# Patient Record
Sex: FEMALE | Race: WHITE | NOT HISPANIC OR LATINO | Employment: FULL TIME | URBAN - METROPOLITAN AREA
[De-identification: names, ages, dates, MRNs, and addresses within clinical notes are randomized per-mention and may not be internally consistent; named-entity substitution may affect disease eponyms.]

---

## 2020-02-19 LAB — RUBELLA ANTIBODY IGG QUANTITATIVE: NORMAL IU/ML

## 2020-06-16 LAB — HBV SURFACE AG SERPL QL IA: NEGATIVE

## 2020-08-28 LAB
GROUP B STREP PCR: POSITIVE
HIV 1+2 AB+HIV1 P24 AG SERPL QL IA: NEGATIVE

## 2020-10-06 ENCOUNTER — HOSPITAL ENCOUNTER (INPATIENT)
Facility: CLINIC | Age: 30
LOS: 3 days | Discharge: HOME | End: 2020-10-09
Attending: ADVANCED PRACTICE MIDWIFE | Admitting: ADVANCED PRACTICE MIDWIFE
Payer: COMMERCIAL

## 2020-10-06 ENCOUNTER — ANESTHESIA EVENT (OUTPATIENT)
Dept: OBGYN | Facility: CLINIC | Age: 30
End: 2020-10-06
Payer: COMMERCIAL

## 2020-10-06 ENCOUNTER — ANESTHESIA (OUTPATIENT)
Dept: OBGYN | Facility: CLINIC | Age: 30
End: 2020-10-06
Payer: COMMERCIAL

## 2020-10-06 DIAGNOSIS — Z98.891 S/P CESAREAN SECTION: Primary | ICD-10-CM

## 2020-10-06 PROBLEM — Z34.93 PREGNANCY WITH PRENATAL CARE ELSEWHERE IN THIRD TRIMESTER: Status: ACTIVE | Noted: 2020-10-06

## 2020-10-06 PROBLEM — O48.0 POST-TERM PREGNANCY, 40-42 WEEKS OF GESTATION: Status: ACTIVE | Noted: 2020-10-06

## 2020-10-06 PROBLEM — B95.1 POSITIVE GBS TEST: Status: ACTIVE | Noted: 2020-10-06

## 2020-10-06 LAB
ABO + RH BLD: NORMAL
ABO + RH BLD: NORMAL
BASOPHILS # BLD AUTO: 0 10E9/L (ref 0–0.2)
BASOPHILS NFR BLD AUTO: 0.2 %
BLD GP AB SCN SERPL QL: NORMAL
BLOOD BANK CMNT PATIENT-IMP: NORMAL
DIFFERENTIAL METHOD BLD: ABNORMAL
EOSINOPHIL # BLD AUTO: 0.1 10E9/L (ref 0–0.7)
EOSINOPHIL NFR BLD AUTO: 0.4 %
ERYTHROCYTE [DISTWIDTH] IN BLOOD BY AUTOMATED COUNT: 12.3 % (ref 10–15)
HCT VFR BLD AUTO: 36.7 % (ref 35–47)
HGB BLD-MCNC: 12.6 G/DL (ref 11.7–15.7)
IMM GRANULOCYTES # BLD: 0.1 10E9/L (ref 0–0.4)
IMM GRANULOCYTES NFR BLD: 0.6 %
LABORATORY COMMENT REPORT: NORMAL
LYMPHOCYTES # BLD AUTO: 2.5 10E9/L (ref 0.8–5.3)
LYMPHOCYTES NFR BLD AUTO: 13.5 %
MCH RBC QN AUTO: 33.1 PG (ref 26.5–33)
MCHC RBC AUTO-ENTMCNC: 34.3 G/DL (ref 31.5–36.5)
MCV RBC AUTO: 96 FL (ref 78–100)
MONOCYTES # BLD AUTO: 1.5 10E9/L (ref 0–1.3)
MONOCYTES NFR BLD AUTO: 7.8 %
NEUTROPHILS # BLD AUTO: 14.6 10E9/L (ref 1.6–8.3)
NEUTROPHILS NFR BLD AUTO: 77.5 %
NRBC # BLD AUTO: 0 10*3/UL
NRBC BLD AUTO-RTO: 0 /100
PLATELET # BLD AUTO: 168 10E9/L (ref 150–450)
RBC # BLD AUTO: 3.81 10E12/L (ref 3.8–5.2)
SARS-COV-2 RNA SPEC QL NAA+PROBE: NEGATIVE
SARS-COV-2 RNA SPEC QL NAA+PROBE: NORMAL
SPECIMEN EXP DATE BLD: NORMAL
SPECIMEN SOURCE: NORMAL
SPECIMEN SOURCE: NORMAL
T PALLIDUM AB SER QL: NONREACTIVE
WBC # BLD AUTO: 18.8 10E9/L (ref 4–11)

## 2020-10-06 PROCEDURE — 250N000011 HC RX IP 250 OP 636: Performed by: ADVANCED PRACTICE MIDWIFE

## 2020-10-06 PROCEDURE — 250N000011 HC RX IP 250 OP 636

## 2020-10-06 PROCEDURE — U0003 INFECTIOUS AGENT DETECTION BY NUCLEIC ACID (DNA OR RNA); SEVERE ACUTE RESPIRATORY SYNDROME CORONAVIRUS 2 (SARS-COV-2) (CORONAVIRUS DISEASE [COVID-19]), AMPLIFIED PROBE TECHNIQUE, MAKING USE OF HIGH THROUGHPUT TECHNOLOGIES AS DESCRIBED BY CMS-2020-01-R: HCPCS | Performed by: ADVANCED PRACTICE MIDWIFE

## 2020-10-06 PROCEDURE — 86780 TREPONEMA PALLIDUM: CPT | Performed by: ADVANCED PRACTICE MIDWIFE

## 2020-10-06 PROCEDURE — 250N000009 HC RX 250: Performed by: ADVANCED PRACTICE MIDWIFE

## 2020-10-06 PROCEDURE — 86850 RBC ANTIBODY SCREEN: CPT | Performed by: ADVANCED PRACTICE MIDWIFE

## 2020-10-06 PROCEDURE — 00HU33Z INSERTION OF INFUSION DEVICE INTO SPINAL CANAL, PERCUTANEOUS APPROACH: ICD-10-PCS | Performed by: ANESTHESIOLOGY

## 2020-10-06 PROCEDURE — G0463 HOSPITAL OUTPT CLINIC VISIT: HCPCS

## 2020-10-06 PROCEDURE — 85025 COMPLETE CBC W/AUTO DIFF WBC: CPT | Performed by: ADVANCED PRACTICE MIDWIFE

## 2020-10-06 PROCEDURE — 86901 BLOOD TYPING SEROLOGIC RH(D): CPT | Performed by: ADVANCED PRACTICE MIDWIFE

## 2020-10-06 PROCEDURE — 120N000002 HC R&B MED SURG/OB UMMC

## 2020-10-06 PROCEDURE — 3E0R3BZ INTRODUCTION OF ANESTHETIC AGENT INTO SPINAL CANAL, PERCUTANEOUS APPROACH: ICD-10-PCS | Performed by: ANESTHESIOLOGY

## 2020-10-06 PROCEDURE — 258N000003 HC RX IP 258 OP 636: Performed by: ADVANCED PRACTICE MIDWIFE

## 2020-10-06 PROCEDURE — 86900 BLOOD TYPING SEROLOGIC ABO: CPT | Performed by: ADVANCED PRACTICE MIDWIFE

## 2020-10-06 RX ORDER — PENICILLIN G POTASSIUM 5000000 [IU]/1
5 INJECTION, POWDER, FOR SOLUTION INTRAMUSCULAR; INTRAVENOUS ONCE
Status: COMPLETED | OUTPATIENT
Start: 2020-10-06 | End: 2020-10-06

## 2020-10-06 RX ORDER — NALBUPHINE HYDROCHLORIDE 20 MG/ML
2.5-5 INJECTION, SOLUTION INTRAMUSCULAR; INTRAVENOUS; SUBCUTANEOUS EVERY 6 HOURS PRN
Status: DISCONTINUED | OUTPATIENT
Start: 2020-10-06 | End: 2020-10-07

## 2020-10-06 RX ORDER — OXYTOCIN/0.9 % SODIUM CHLORIDE 30/500 ML
PLASTIC BAG, INJECTION (ML) INTRAVENOUS
Status: DISCONTINUED
Start: 2020-10-06 | End: 2020-10-07 | Stop reason: HOSPADM

## 2020-10-06 RX ORDER — NALOXONE HYDROCHLORIDE 0.4 MG/ML
.1-.4 INJECTION, SOLUTION INTRAMUSCULAR; INTRAVENOUS; SUBCUTANEOUS
Status: DISCONTINUED | OUTPATIENT
Start: 2020-10-06 | End: 2020-10-07

## 2020-10-06 RX ORDER — ACETAMINOPHEN 325 MG/1
650 TABLET ORAL EVERY 4 HOURS PRN
Status: DISCONTINUED | OUTPATIENT
Start: 2020-10-06 | End: 2020-10-07

## 2020-10-06 RX ORDER — FENTANYL/BUPIVACAINE/NS/PF 2-1250MCG
PLASTIC BAG, INJECTION (ML) INJECTION
Status: COMPLETED
Start: 2020-10-06 | End: 2020-10-06

## 2020-10-06 RX ORDER — IBUPROFEN 800 MG/1
800 TABLET, FILM COATED ORAL
Status: DISCONTINUED | OUTPATIENT
Start: 2020-10-06 | End: 2020-10-07

## 2020-10-06 RX ORDER — OXYTOCIN 10 [USP'U]/ML
INJECTION, SOLUTION INTRAMUSCULAR; INTRAVENOUS
Status: DISCONTINUED
Start: 2020-10-06 | End: 2020-10-07 | Stop reason: HOSPADM

## 2020-10-06 RX ORDER — SODIUM CHLORIDE, SODIUM LACTATE, POTASSIUM CHLORIDE, CALCIUM CHLORIDE 600; 310; 30; 20 MG/100ML; MG/100ML; MG/100ML; MG/100ML
INJECTION, SOLUTION INTRAVENOUS CONTINUOUS
Status: DISCONTINUED | OUTPATIENT
Start: 2020-10-06 | End: 2020-10-07

## 2020-10-06 RX ORDER — CARBOPROST TROMETHAMINE 250 UG/ML
250 INJECTION, SOLUTION INTRAMUSCULAR
Status: DISCONTINUED | OUTPATIENT
Start: 2020-10-06 | End: 2020-10-07

## 2020-10-06 RX ORDER — ONDANSETRON 2 MG/ML
4 INJECTION INTRAMUSCULAR; INTRAVENOUS EVERY 6 HOURS PRN
Status: DISCONTINUED | OUTPATIENT
Start: 2020-10-06 | End: 2020-10-07

## 2020-10-06 RX ORDER — FENTANYL CITRATE 50 UG/ML
50-100 INJECTION, SOLUTION INTRAMUSCULAR; INTRAVENOUS
Status: DISCONTINUED | OUTPATIENT
Start: 2020-10-06 | End: 2020-10-07

## 2020-10-06 RX ORDER — EPHEDRINE SULFATE 50 MG/ML
5 INJECTION, SOLUTION INTRAMUSCULAR; INTRAVENOUS; SUBCUTANEOUS
Status: DISCONTINUED | OUTPATIENT
Start: 2020-10-06 | End: 2020-10-07

## 2020-10-06 RX ORDER — OXYCODONE AND ACETAMINOPHEN 5; 325 MG/1; MG/1
1 TABLET ORAL
Status: DISCONTINUED | OUTPATIENT
Start: 2020-10-06 | End: 2020-10-07

## 2020-10-06 RX ORDER — LIDOCAINE 40 MG/G
CREAM TOPICAL
Status: DISCONTINUED | OUTPATIENT
Start: 2020-10-06 | End: 2020-10-07

## 2020-10-06 RX ORDER — OXYTOCIN/0.9 % SODIUM CHLORIDE 30/500 ML
1-24 PLASTIC BAG, INJECTION (ML) INTRAVENOUS CONTINUOUS
Status: DISCONTINUED | OUTPATIENT
Start: 2020-10-06 | End: 2020-10-07

## 2020-10-06 RX ORDER — MISOPROSTOL 200 UG/1
TABLET ORAL
Status: DISCONTINUED
Start: 2020-10-06 | End: 2020-10-07 | Stop reason: HOSPADM

## 2020-10-06 RX ORDER — METHYLERGONOVINE MALEATE 0.2 MG/ML
200 INJECTION INTRAVENOUS
Status: COMPLETED | OUTPATIENT
Start: 2020-10-06 | End: 2020-10-07

## 2020-10-06 RX ORDER — OXYTOCIN/0.9 % SODIUM CHLORIDE 30/500 ML
100-340 PLASTIC BAG, INJECTION (ML) INTRAVENOUS CONTINUOUS PRN
Status: COMPLETED | OUTPATIENT
Start: 2020-10-06 | End: 2020-10-07

## 2020-10-06 RX ORDER — OXYTOCIN 10 [USP'U]/ML
10 INJECTION, SOLUTION INTRAMUSCULAR; INTRAVENOUS
Status: DISCONTINUED | OUTPATIENT
Start: 2020-10-06 | End: 2020-10-07

## 2020-10-06 RX ORDER — LIDOCAINE HYDROCHLORIDE 10 MG/ML
INJECTION, SOLUTION EPIDURAL; INFILTRATION; INTRACAUDAL; PERINEURAL
Status: DISCONTINUED
Start: 2020-10-06 | End: 2020-10-07 | Stop reason: HOSPADM

## 2020-10-06 RX ADMIN — SODIUM CHLORIDE, POTASSIUM CHLORIDE, SODIUM LACTATE AND CALCIUM CHLORIDE: 600; 310; 30; 20 INJECTION, SOLUTION INTRAVENOUS at 05:06

## 2020-10-06 RX ADMIN — SODIUM CHLORIDE, POTASSIUM CHLORIDE, SODIUM LACTATE AND CALCIUM CHLORIDE: 600; 310; 30; 20 INJECTION, SOLUTION INTRAVENOUS at 17:23

## 2020-10-06 RX ADMIN — Medication: at 23:28

## 2020-10-06 RX ADMIN — Medication 1 MILLI-UNITS/MIN: at 09:09

## 2020-10-06 RX ADMIN — Medication 2.5 MILLION UNITS: at 13:08

## 2020-10-06 RX ADMIN — SODIUM CHLORIDE, POTASSIUM CHLORIDE, SODIUM LACTATE AND CALCIUM CHLORIDE: 600; 310; 30; 20 INJECTION, SOLUTION INTRAVENOUS at 01:45

## 2020-10-06 RX ADMIN — SODIUM CHLORIDE, POTASSIUM CHLORIDE, SODIUM LACTATE AND CALCIUM CHLORIDE: 600; 310; 30; 20 INJECTION, SOLUTION INTRAVENOUS at 23:20

## 2020-10-06 RX ADMIN — PENICILLIN G POTASSIUM 5 MILLION UNITS: 5000000 POWDER, FOR SOLUTION INTRAMUSCULAR; INTRAPLEURAL; INTRATHECAL; INTRAVENOUS at 01:45

## 2020-10-06 RX ADMIN — Medication 2.5 MILLION UNITS: at 09:04

## 2020-10-06 RX ADMIN — Medication 2.5 MILLION UNITS: at 21:26

## 2020-10-06 RX ADMIN — Medication 2.5 MILLION UNITS: at 05:04

## 2020-10-06 RX ADMIN — Medication 2.5 MILLION UNITS: at 17:23

## 2020-10-06 ASSESSMENT — ACTIVITIES OF DAILY LIVING (ADL)
FALL_HISTORY_WITHIN_LAST_SIX_MONTHS: NO
TOILETING_ISSUES: NO

## 2020-10-06 NOTE — PROVIDER NOTIFICATION
10/06/20 1624   Provider Notification   Provider Name/Title BARBIE Riley   Method of Notification At Bedside   Notification Reason Labor Status   Update Pitocin to 5.

## 2020-10-06 NOTE — PROGRESS NOTES
Labor progress note    S:  Patient continues to change positions frequently.   and  at bedside.  Patient now more vocal during contractions.    O:  Blood pressure 123/70, temperature 98.4  F (36.9  C), temperature source Oral, resp. rate 18.  General appearance: uncomfortable with contractions.  Contractions: Every 1.5-5 minutes. 50-90 seconds duration.  Palpate: moderate.  FHT: Baseline 125 with moderate variability. Accelerations are not present. No decelerations present.  ROM: thick meconium fluid. Membranes have been ruptured for over 18 hours.  Pelvic exam: deferred  Pitocin- 5 mu/min.,  Antibiotics- PCN      A:  IUP @ 42w0d PROM, post dates preganancy   Fetal Heart rate tracing Category one  GBS- positive  Patient Active Problem List   Diagnosis     Meconium in amniotic fluid     Post-term pregnancy, 40-42 weeks of gestation     Pregnancy with prenatal care elsewhere in third trimester     Positive GBS test         P:  Continue to titrate Pitocin as needed   Re-evaluated in 2-4 hours as needed  Continue GBS prophylaxis.  ALEX Luke CNM

## 2020-10-06 NOTE — PLAN OF CARE
"Pt up and about in room, eating breakfast, reports ctx have spaced recently. Afebrile, baby active, cont to leak green fluid and blood present. Reports that CNM has mtg at 0900 so will wait until after breakfast \"settles\" and CNM done with 0900 mtg. Discuss saline locking IV until abx due at 0900, then cont fluid when pitocin is started.  at bedside providing supportive cares. Exp mgt.  "

## 2020-10-06 NOTE — PROGRESS NOTES
Labor progress note    S:  Patient upright in room, has been sitting on stool and birth ball, uncomfortable with abdominal pressure.  Denies uterine pain at this time.  Has been declining Pitocin induction, but now receptive to starting medication after she eats breakfast.      O:  Temperature 98.1  F (36.7  C), temperature source Oral.  General appearance: comfortable.  Contractions: Every 5-8 minutes.  seconds duration.  Palpate: mild and cramping.  FHT: Baseline 140 with moderate variability. Accelerations are present. No decelerations present.  ROM: thick meconium fluid. Membranes have been ruptured since 1715 on 10/5/2020.  Pelvic exam: deferred  Shi Score: 8 per previous exam  Pitocin- none,  Antibiotics- PCN      A:  IUP @ 42w0d PROM   Fetal Heart rate tracing Category one  GBS- positive  Patient Active Problem List   Diagnosis     Meconium in amniotic fluid     Post-term pregnancy, 40-42 weeks of gestation     Pregnancy with prenatal care elsewhere in third trimester     Positive GBS test         P:  Patient plans to eat breakfast, then receptive to starting Pitocin.   Re-evaluate in 2-4 hours as needed  ALEX Luke CNM

## 2020-10-06 NOTE — PROGRESS NOTES
Labor progress note    S: Pt has started to feel more discomfort/stronger contractions.  Has been using birthing ball and now in left lateral with peanut ball. Agreeable to continue plan of care including Category 2 FHR tracing.     O:  There were no vitals taken for this visit.  General appearance: uncomfortable with contractions but coping well.  Contractions: Every 1-4 minutes.  seconds duration.  Palpate: mild and moderate.  Soft resting tone.  Uterine coupling noted  FHT: Baseline 140 with moderate variability with periods of minimal. Accelerations not present. +prolonged deceleration after tetanic contaction that resolved with maternal position change.  ROM: moderate meconium fluid. Membranes have been ruptured for ~10.5 hours.  PELVIC EXAM:deferred  Shi Score: Total:  with last exam    Pitocin- none,  Antibiotics- PCN  IV at maintenance rate after LR bolus        # Pain Assessment:    - Geraldine is experiencing pain due to labor. Pain management was discussed with Geraldine and her significant other and the plan was created in a collaborative fashion.  Geraldine's response to the current recommendations: engaged  - Coping with discomfort, considering calling her         A:  30 year old  with IUP @ 42w0d ROM for Meconium fluid, post dates  Early labor  ROM <18 hours, afebrile  Fetal Heart Rate Tracing category two  GBS- positive- antibiotics started  [facility] transfer    Patient Active Problem List   Diagnosis     Meconium in amniotic fluid     Post-term pregnancy, 40-42 weeks of gestation     Pregnancy with prenatal care elsewhere in third trimester     Positive GBS test         P:  -Ambulation, hydration, position changes, birthing ball/sling and tub options to facilitate labor.    - Encouraged to touch base with her .  Aware of pain medication options available, pt will ask if desires to discuss  - Birth plan reviewed.  Pt agreeable to consider AMTSL given ROM for meconium   -Labor augmentation  with Pitocin reviewed with pt including risk/benefit of infection and cateogry 2 FHR tracing. Pt prefers to reassess contraction pattern ~12 hours after ROM and discuss.  -At this time no concern for fetal metabolic acidemia at this time due to moderate variability and accelerations.Consult MD and consider operative delivery prn if persistent or worsening category 2 FHR tracing despite interventions remote from delivery.   Reviewed again Category 2 FHR tracing with pt and her partner including C section if remote from delivery.  Per protocol at this time continue observation and reassess per algorithm prn  - Continue GBS prophylaxis with PCN per protocol  - Continue maternal/fetal surveillance for s/s of infection  - Reassess 2 hours and prn     Marleny JOHNSON, CNM

## 2020-10-06 NOTE — PLAN OF CARE
"Pt amenable to start pitocin \"slowly\" and started at 1 mu. Discussed dosing, increasing, decreasing, turning off. Birth plan reviewed. Paged pediatrician to discuss vit K with pt and  who are considering oral vit   "

## 2020-10-06 NOTE — PLAN OF CARE
VSS. Afebrile. Pt reports cramping/tightening, leaking of meconium stained fluid. Denies bleeding, headache, RUQ pain, increased swelling, shortness of breath. Pt medicated with Abx (x2)  for GBS+ status overnight. EFM/TOCO applied (see flowsheets).    Support person, emerita Smart. Crystal Walker, will arrive when pt is in active labor. Pt has ordered breakfast and would like to eat breakfast/let food settle before staring pitocin for labor augmentation.     Anticipate . Provide labor/coping assistance as needed by patient and support person.  Observe for and notify care provider of indications of progressing labor, need for pain medications,  or signs of fetal/maternal compromise.

## 2020-10-06 NOTE — PLAN OF CARE
Pt laboring well, changing pos, using ball, cub, sitting and standing.  and  providing supportive cares. Occas variable decels. CNM updated. Exp mgt.

## 2020-10-06 NOTE — PLAN OF CARE
Gaps in monitoring related to failed tele, failed marta and two other failed tele attempts. Pt required to use monitor next to bed.

## 2020-10-06 NOTE — H&P
ADMIT NOTE  =================  42w0d    Geraldine Neves is a 30 year old female with an No LMP recorded. Patient is pregnant. and Estimated Date of Delivery: 2020 [facility] at 12:59 AM with thick meconium stained fluid at 42 weeks as a transfer from [facility] .     HPI  ================  Patient with planned Out of Hospital Birth is transferring by car to the hospital for thick Meconium Stained fluid at 42 weeks.  Had Reactive NST 10/5 and then Took Castor oil x1. Had initially clear fluid that was then noted thick meconium.    Patient has been seen by [facility] for prenatal care.  Transferring midwife name(s),/birth center & phone number: [facility]  [phone]  Midwife here supporting patient: No  Records received, reviewed and scanned into chart.     Contractions- mild and irregular  Fetal movement- Pt states +fetal movement since admission to triage.  Possibly decreased fetal movement throughout the evening but has been paying more attention to the uterine cramping.  Baby is usually active ~2-3 am when pt up to bathroom.   ROM- yes clear fluid SROM 10/5/2020 @ 1715.  Thick meconium fluid noted ~2330  Vaginal bleeding- none  GBS- positive- not treated  FOB- is involved, Berry present and supportive.   Other labor support- Plans dilcia Alexander     Weight gain- 191 - 147 lbs, Total weight gain- 44 lbs  Height- 61in  BMI- 24.7  First prenatal visits a [facility]. Initiated  at 13 weeks, Transferred to [facility] at 26 weeks.  Total visits at [facility]- 10    NKDA  History of migraines  No medications  Pt's mother had 4 c/sections    PROBLEM LIST  =================  Patient Active Problem List    Diagnosis Date Noted     Meconium in amniotic fluid 10/06/2020     Priority: Medium     Post-term pregnancy, 40-42 weeks of gestation 10/06/2020     Priority: Medium     Pregnancy with prenatal care elsewhere in third trimester 10/06/2020     Priority: Medium     Patient  with planned Out of Hospital Birth is transferring by car to the hospital for thick Meconium Stained fluid at 42 weeks.  Had Reactive NST 10/5 and then Took Castor oil x1. Had initially clear fluid that was then noted thick meconium.    Patient has been seen by [facility] for prenatal care.  Transferring midwife name(s),/birth center & phone number: [facility]  [phone]  Midwife here supporting patient: No  Records received, reviewed and scanned into chart.          Positive GBS test 10/06/2020     Priority: Medium       HISTORIES  ============  No Known Allergies  History reviewed. No pertinent past medical history.  History reviewed. No pertinent surgical history..  History reviewed. No pertinent family history.  Social History     Tobacco Use     Smoking status: Never Smoker     Smokeless tobacco: Never Used   Substance Use Topics     Alcohol use: Not Currently     OB History    Para Term  AB Living   1 0 0 0 0 0   SAB TAB Ectopic Multiple Live Births   0 0 0 0 0      # Outcome Date GA Lbr Fernando/2nd Weight Sex Delivery Anes PTL Lv   1 Current                 LABS:   ===========  Prenatal Labs reviewed per transfer records:   Rhogam not indicated  2020 - ABO/ RH: O positive. Antibody negative  2020 - Rubella immune   2020 -HBsAg: negative   2020 - HIV: negative   2020 - RPR: NR   GCT: date 2020, result 128, passed  GBS: date 2020, result POSITIVE  OTHER:   2020 - HgA1c 5.0  2020 - Hg 12.5/Hct 38.3/ Plt 240  2020 - Urine culture negative  2020 - GC/CT negative  2020 - Hep C negative  2020 - Creatine urine 31    2020 - Hg 11.7    NST reactive 10/5/2020         Lab Results   Component Value Date    ABO O 10/06/2020    RH Pos 10/06/2020    AS Neg 10/06/2020    HGB 12.6 10/06/2020       Other labs:  Results for orders placed or performed during the hospital encounter of 10/06/20 (from the past 24 hour(s))   CBC with platelets  differential   Result Value Ref Range    WBC 18.8 (H) 4.0 - 11.0 10e9/L    RBC Count 3.81 3.8 - 5.2 10e12/L    Hemoglobin 12.6 11.7 - 15.7 g/dL    Hematocrit 36.7 35.0 - 47.0 %    MCV 96 78 - 100 fl    MCH 33.1 (H) 26.5 - 33.0 pg    MCHC 34.3 31.5 - 36.5 g/dL    RDW 12.3 10.0 - 15.0 %    Platelet Count 168 150 - 450 10e9/L    Diff Method Automated Method     % Neutrophils 77.5 %    % Lymphocytes 13.5 %    % Monocytes 7.8 %    % Eosinophils 0.4 %    % Basophils 0.2 %    % Immature Granulocytes 0.6 %    Nucleated RBCs 0 0 /100    Absolute Neutrophil 14.6 (H) 1.6 - 8.3 10e9/L    Absolute Lymphocytes 2.5 0.8 - 5.3 10e9/L    Absolute Monocytes 1.5 (H) 0.0 - 1.3 10e9/L    Absolute Eosinophils 0.1 0.0 - 0.7 10e9/L    Absolute Basophils 0.0 0.0 - 0.2 10e9/L    Abs Immature Granulocytes 0.1 0 - 0.4 10e9/L    Absolute Nucleated RBC 0.0    ABO/Rh type and screen   Result Value Ref Range    ABO O     RH(D) Pos     Antibody Screen Neg     Test Valid Only At          [facility]    Specimen Expires 10/09/2020        ULTRASOUND  =============  Date 3/2/2020, result JULIET confirmed 9/22/2020  No placenta previa noted.     ROS  =========  Pt denies significant respiratory, cardiovacular, GI, or muscular/skeletalcomplaints.    See RN data base ROS.       PHYSICAL EXAM:  ===============  /61. Maternal HR 81.  Temp Afebrile  General appearance: comfortable  GENERAL APPEARANCE: healthy, alert and no distress  RESP: lungs clear to auscultation - no rales, rhonchi or wheezes  CV: regular rates and rhythm, normal S1 S2, no S3 or S4 and no murmur,and no varicosities  ABDOMEN:  soft, nontender, no epigastric pain  SKIN: no suspicious lesions or rashes  NEURO: Denies headache, blurred vision, other vision changes  PSYCH: mentation appears normal. and affect normal/bright  Legs: Reflexes normal bilaterally, No clonus bilaterally, No edema and Negative Enrrique's      Abdomen: gravid, vertex fetus  per Leopold's, non-tender between contractions.   Cephalic presentation confirmed by BSUS in transverse abdominal view  EFW-  7 lbs 10oz by Leopold's.   CONTACTIONS: every 2-6 minutes x 50-80 seconds. Mild to palpation.  Soft resting tone.   FETAL HEART TONES: continuous EFM- baseline 135 with moderate and periods of minimal variability.  Accelerations- present.  Decelerations- late and variable non recurrent.    PELVIC EXAM: 1.5/ 70%/ Mid/ average/ -2 after risk/benefit discussion given ROM  WELLS SCORE: 8  BLOODY SHOW: no   ROM:yes moderate, thick meconium since ~2330. Initially clear SROM 10/5 @ 1715  ROMPLUS: not done    # Pain Assessment:   - Geraldine is experiencing pain due to early labor. Pain management was discussed with Geraldine and her significant other and the plan was created in a collaborative fashion.  Geraldine's response to the current recommendations: engaged  - Coping well with non pharmacologic supports        ASSESSMENT:  ==============  30 year old  with IUP @ 42w0d admitted with SROM fpr thick meconium fluid without labor - ROM ~8.5hours. Afebrile  Fetal Heart Rate - category two  GBS- positive- not treated  Transfer from [facility]       PLAN:  ===========  -Admit - see IP orders, T&S.  COVID testing.    -Prophylactic IV antibiotic for positive GBS status reviewed with pt. Agreeable to PCN.  -Ambulation, hydration, position changes, birthing ball and tub options to facilitate labor reviewed with pt . Telemetry monitoring given recommendation for continuous EFM with meconium fluid  -At this time no concern for fetal metabolic acidemia at this time due to periods of moderate variability and accelerations.Consult category 2 FHR tracing algorithm prn if persistent or worsening category 2 FHR tracing despite interventions remote from delivery.    - Continue close maternal/fetal surveillance for s/s of infection   -Reviewed recommendation for pitocin induction of labor given Wells score, ROM,  and category 2 FHR tracing.  Reviewed risk of infection increases with longer ROM, highest chance is after 24 hours ROM.  Reviewed pitocin easiest to titrate if worsening category 2 FHR tracing.   - Reviewed if Category 3 FHR tracing would recommend c/section which could be STAT under general anesthesia prn.    - Reviewed plan NICU at delivery given meconium fluid.  - Birth preferences reviewed  Pt and her partner allowed privacy to discuss, will return to answer questions and make plan when ready.   Marleny Carbone, APRN CNM

## 2020-10-06 NOTE — PROGRESS NOTES
Labor progress note    S:  Geraldine reports that contractions have become stronger since Pitocin initiation. She is coping well through these contractions. Her partner Berry and dilcia Alexander are present are supportive. Geraldine was able to eat breakfast and is ambulating in her room.     O:  Blood pressure 112/70, temperature 98.4  F (36.9  C), temperature source Oral, resp. rate 16.  General appearance: uncomfortable with contractions.  Contractions: Every 1-9 minutes. 60-90 seconds duration. Palpate: mild to moderate  FHT: Baseline 120 with moderate variability. Accelerations are present. No decelerations present.  ROM: light meconium fluid. Membranes have been ruptured for 18 hours.  Pelvic exam: deferred     Pitocin- 2 mu/min.,  Antibiotics- PCN      A:  IUP @ 42w0d early labor and labor augmentation for postdates and meconium stained fluid.   Fetal Heart rate tracing category one  GBS- positive  Patient Active Problem List   Diagnosis     Meconium in amniotic fluid     Post-term pregnancy, 40-42 weeks of gestation     Pregnancy with prenatal care elsewhere in third trimester     Positive GBS test         P:  Comfort measures prn.  Continue prophylactic antibiotic for + GBS status  Anticipate   Labor augmentation with Pitocin per protocol.   Reevaluate in 2-4 hours/PRN     I, JAMES Montelongo am serving as a scribe; to document services personally performed by  ALEX Arauz CNM based on data collection and the provider's statements to me.     JAMES Montelongo  I was present with angélica Montelongoife student who participated in the service and in the documentation of the services provided. I have verified the history and personally performed the physical exam and medical decision making, as documented by the student and edited by me.   ALEX Luke CNM

## 2020-10-06 NOTE — PROGRESS NOTES
Labor progress note    S: Pt more uncomfortable with contractions. Reports that they feel stronger. Coping well through contractions. Ambulating and using birth balls in the room with support partner and .       O:  Blood pressure 112/70, temperature 97.5  F (36.4  C), temperature source Oral, resp. rate 16.  General appearance: uncomfortable with contractions.  Contractions: Every 2-5 minutes.50-90 seconds duration.  Palpate: mild.   FHT: Baseline 125 with moderate variability. Accelerations present. One variable deceleration present.  ROM: light meconium fluid . Membranes have been ruptured for 21 hours.  Pelvic exam: deferred   -------------------------------  Pitocin- 3 mu/min.,  Antibiotics- PCN    A:  IUP @ 42w0d early labor, postterm, meconium stained fluid. Augmentation with pitocin.    Fetal Heart rate tracing category one.  GBS- positive  Patient Active Problem List   Diagnosis     Meconium in amniotic fluid     Post-term pregnancy, 40-42 weeks of gestation     Pregnancy with prenatal care elsewhere in third trimester     Positive GBS test       P:  Comfort measures prn. Continue ambulation, hydration, position changes, and rest when needed.  Labor augmentation with Pitocin per protocol.   Discussed last SVE at  0200 and minimizing cervical exams due to ROM of 21 hours and GBS positive. Pt declined SVE at this time. Will continue to monitor contraction pattern and labor progress.  Reevaluate in 2-4 hours/PRN.  Anticipate .        I, JAMES Montelongo am serving as a scribe; to document services personally performed by  ALEX Arauz CNM based on data collection and the provider's statements to me.     JAMES Montelongo was present with angélica Montelongoife student who participated in the service and in the documentation of the services provided. I have verified the history and personally performed the physical exam and medical decision making, as documented by the student and  edited by me.   ALEX Luke CNM

## 2020-10-06 NOTE — PROGRESS NOTES
"Labor progress note    S: Pt has been side lying with peanut ball, up to bathroom for void, on birthing ball, and in hands and knees to help cope with contractions.   She is unable to tell if they're actually stronger than before.  Is agreeable to have TOCO readjusted by RN to assess timing of contractions for plan of care discussion.   Reporting at times back discomfort with contractions, open to sifting/rebozzo.     O:  Temperature 98.3  F (36.8  C), temperature source Oral.  General appearance: uncomfortable with contractions but coping.  Contractions: Every 1-5 minutes. 60-70 seconds duration.  Palpate: mild and moderate.  Soft resting tone.  Periods of uterine coupling noted.  TOCO readjusted by RN.   FHT: Baseline 130 with moderate variability, periods of minimal. Accelerations are present. No decelerations present.  ROM: moderate meconium fluid. Membranes have been ruptured for ~12 hours.  PELVIC EXAM:deferred  Shi Score: Total: 8/13 on admit    Pitocin- none,  Antibiotics- PCN  IV at maintenance rate    Pt assisted into hands and knees on bed, \"shake the apple tree\" spinning babies performed between 2 surges, pt tolerated well.     Results for orders placed or performed during the hospital encounter of 10/06/20   CBC with platelets differential     Status: Abnormal   Result Value Ref Range    WBC 18.8 (H) 4.0 - 11.0 10e9/L    RBC Count 3.81 3.8 - 5.2 10e12/L    Hemoglobin 12.6 11.7 - 15.7 g/dL    Hematocrit 36.7 35.0 - 47.0 %    MCV 96 78 - 100 fl    MCH 33.1 (H) 26.5 - 33.0 pg    MCHC 34.3 31.5 - 36.5 g/dL    RDW 12.3 10.0 - 15.0 %    Platelet Count 168 150 - 450 10e9/L    Diff Method Automated Method     % Neutrophils 77.5 %    % Lymphocytes 13.5 %    % Monocytes 7.8 %    % Eosinophils 0.4 %    % Basophils 0.2 %    % Immature Granulocytes 0.6 %    Nucleated RBCs 0 0 /100    Absolute Neutrophil 14.6 (H) 1.6 - 8.3 10e9/L    Absolute Lymphocytes 2.5 0.8 - 5.3 10e9/L    Absolute Monocytes 1.5 (H) 0.0 - 1.3 " 10e9/L    Absolute Eosinophils 0.1 0.0 - 0.7 10e9/L    Absolute Basophils 0.0 0.0 - 0.2 10e9/L    Abs Immature Granulocytes 0.1 0 - 0.4 10e9/L    Absolute Nucleated RBC 0.0    Asymptomatic COVID-19 Virus (Coronavirus) by PCR     Status: None    Specimen: Nasopharyngeal   Result Value Ref Range    COVID-19 Virus PCR to [facility] - Source Nasopharyngeal     COVID-19 Virus PCR to [facility] - Result       Test received-See reflex to IDDL test SARS CoV2 (COVID-19) Virus RT-PCR   SARS-CoV-2 COVID-19 Virus (Coronavirus) RT-PCR Nasopharyngeal     Status: None    Specimen: Nasopharyngeal   Result Value Ref Range    SARS-CoV-2 Virus Specimen Source Nasopharyngeal     SARS-CoV-2 PCR Result NEGATIVE     SARS-CoV-2 PCR Comment       Testing was performed using the Xpert Xpress SARS-CoV-2 Assay on the Cepheid Gene-Xpert   Instrument Systems. Additional information about this Emergency Use Authorization (EUA)   assay can be found via the Lab Guide.     ABO/Rh type and screen     Status: None   Result Value Ref Range    ABO O     RH(D) Pos     Antibody Screen Neg     Test Valid Only At          [facility]    Specimen Expires 10/09/2020              # Pain Assessment:    - Geraldine is experiencing pain due to early labor. Pain management was discussed with Geraldine and her significant other and the plan was created in a collaborative fashion.  Geraldnie's response to the current recommendations: engaged  - Pt coping well overall with non pharmacologic options.  Encourage pt to engage with . Declines pharmacologic options at present.         A:  30 year old  with IUP @ 42w0d Meconium fluid/postdates  OOH transfer  ROM<18 hours, afebrile  Fetal Heart Rate Tracing category two  GBS- positive- antibiotic started    Patient Active Problem List   Diagnosis     Meconium in amniotic fluid     Post-term pregnancy, 40-42 weeks of gestation     Pregnancy with prenatal care elsewhere in third trimester      Positive GBS test         P:  Ambulation, hydration, position changes, birthing ball/sling and tub options to facilitate labor.  Encouraged continued positions to encourage fetal rotation/decsent and maximize fetal oxygenation. Plan repeat spinning babies/sifting prn.   Pain management - pt declines to discuss pharmacologic options. Encouraged to communicate with  for supports.   Reviewed again labor augmentation with Pitocin.  Pt agreeable to have TOCO readjusted by RN to observe contraction pattern over the next 15-30 minutes to discuss  Continue prophylactic IV antibiotic PCN for positive GBS status.   At this time no concern for fetal metabolic acidemia due to moderate variability and accelerations.Continue to consult Category 2 FHR tracing algorithm prn if persistent or worsening category 2 FHR tracing remote from delivery.  Updated pt and her partner on category 2 FHR tracing at this time.   Close observation, reevaluate in 30 minutes and prn     Marleny JOHNSON, BARBIE      ADDENDUM 10/6/2020 6:08 AM  - Pt eating a snack, standing up in between surges.  Reviewed TOCO pattern noted every 1-5 minutes with periods of uterine coupling/tripling.  Recommended pitocin augmentation, pt and her  will consider and call when with questions or decisions.  Aware of change of shift for midwives ~0630. ALEX Alvarenga CNM

## 2020-10-06 NOTE — PLAN OF CARE
Rupture of Membranes Eval Admit Note  Geraldine Neves is a  at 42 weeks here for rule out rupture of membranes with meconium. Pt care being transferred from [facility]. States felt leaking of clear fluid that started on 10/5/2020 at 1715 and moderate amount of fluid with meconium stained  fluid at 2330.  States bleeding Absent. Pt is feeling contractions.  Pt placed on continuous fetal/uterine monitoring. Pt is GBS+ and open to treatment with antibiotics.    Marleny Carbone CNM notified of arrival and condition.  Oriented patient to surroundings. Call light within reach.

## 2020-10-07 ENCOUNTER — ANESTHESIA (OUTPATIENT)
Dept: OBGYN | Facility: CLINIC | Age: 30
End: 2020-10-07
Payer: COMMERCIAL

## 2020-10-07 ENCOUNTER — ANESTHESIA EVENT (OUTPATIENT)
Dept: OBGYN | Facility: CLINIC | Age: 30
End: 2020-10-07
Payer: COMMERCIAL

## 2020-10-07 ENCOUNTER — APPOINTMENT (OUTPATIENT)
Dept: GENERAL RADIOLOGY | Facility: CLINIC | Age: 30
End: 2020-10-07
Attending: OBSTETRICS & GYNECOLOGY
Payer: COMMERCIAL

## 2020-10-07 PROBLEM — Z98.891 S/P CESAREAN SECTION: Status: ACTIVE | Noted: 2020-10-07

## 2020-10-07 LAB
CREAT SERPL-MCNC: 1.27 MG/DL (ref 0.52–1.04)
CREAT UR-MCNC: 188 MG/DL
ERYTHROCYTE [DISTWIDTH] IN BLOOD BY AUTOMATED COUNT: 12.1 % (ref 10–15)
GFR SERPL CREATININE-BSD FRML MDRD: 56 ML/MIN/{1.73_M2}
HCT VFR BLD AUTO: 32.2 % (ref 35–47)
HGB BLD-MCNC: 11.2 G/DL (ref 11.7–15.7)
MCH RBC QN AUTO: 33.2 PG (ref 26.5–33)
MCHC RBC AUTO-ENTMCNC: 34.8 G/DL (ref 31.5–36.5)
MCV RBC AUTO: 96 FL (ref 78–100)
PLATELET # BLD AUTO: 115 10E9/L (ref 150–450)
PROT UR-MCNC: 0.35 G/L
PROT/CREAT 24H UR: 0.19 G/G CR (ref 0–0.2)
RBC # BLD AUTO: 3.37 10E12/L (ref 3.8–5.2)
WBC # BLD AUTO: 25.5 10E9/L (ref 4–11)

## 2020-10-07 PROCEDURE — 370N000002 HC ANESTHESIA TECHNICAL FEE, EACH ADDTL 15 MIN: Performed by: OBSTETRICS & GYNECOLOGY

## 2020-10-07 PROCEDURE — 258N000003 HC RX IP 258 OP 636: Performed by: STUDENT IN AN ORGANIZED HEALTH CARE EDUCATION/TRAINING PROGRAM

## 2020-10-07 PROCEDURE — 360N000022 HC SURGERY LEVEL 3 1ST 30 MIN - UMMC: Performed by: OBSTETRICS & GYNECOLOGY

## 2020-10-07 PROCEDURE — 250N000009 HC RX 250: Performed by: ADVANCED PRACTICE MIDWIFE

## 2020-10-07 PROCEDURE — 250N000011 HC RX IP 250 OP 636: Performed by: STUDENT IN AN ORGANIZED HEALTH CARE EDUCATION/TRAINING PROGRAM

## 2020-10-07 PROCEDURE — 999N000139 HC STATISTIC PRE-PROCEDURE ASSESSMENT II: Performed by: OBSTETRICS & GYNECOLOGY

## 2020-10-07 PROCEDURE — 250N000009 HC RX 250: Performed by: NURSE ANESTHETIST, CERTIFIED REGISTERED

## 2020-10-07 PROCEDURE — 761N000004 HC RECOVERY PHASE 1 LEVEL 2 EA ADDTL HR: Performed by: OBSTETRICS & GYNECOLOGY

## 2020-10-07 PROCEDURE — 250N000011 HC RX IP 250 OP 636: Performed by: ANESTHESIOLOGY

## 2020-10-07 PROCEDURE — 84156 ASSAY OF PROTEIN URINE: CPT | Performed by: ADVANCED PRACTICE MIDWIFE

## 2020-10-07 PROCEDURE — BT1FZZZ FLUOROSCOPY OF LEFT KIDNEY, URETER AND BLADDER: ICD-10-PCS | Performed by: OBSTETRICS & GYNECOLOGY

## 2020-10-07 PROCEDURE — 82565 ASSAY OF CREATININE: CPT | Performed by: OBSTETRICS & GYNECOLOGY

## 2020-10-07 PROCEDURE — 250N000011 HC RX IP 250 OP 636: Performed by: NURSE ANESTHETIST, CERTIFIED REGISTERED

## 2020-10-07 PROCEDURE — 120N000002 HC R&B MED SURG/OB UMMC

## 2020-10-07 PROCEDURE — 999N000180 XR SURGERY CARM FLUORO LESS THAN 5 MIN: Mod: TC

## 2020-10-07 PROCEDURE — 370N000001 HC ANESTHESIA TECHNICAL FEE, 1ST 30 MIN: Performed by: OBSTETRICS & GYNECOLOGY

## 2020-10-07 PROCEDURE — 250N000009 HC RX 250: Performed by: STUDENT IN AN ORGANIZED HEALTH CARE EDUCATION/TRAINING PROGRAM

## 2020-10-07 PROCEDURE — 0TJ98ZZ INSPECTION OF URETER, VIA NATURAL OR ARTIFICIAL OPENING ENDOSCOPIC: ICD-10-PCS | Performed by: OBSTETRICS & GYNECOLOGY

## 2020-10-07 PROCEDURE — 85027 COMPLETE CBC AUTOMATED: CPT | Performed by: OBSTETRICS & GYNECOLOGY

## 2020-10-07 PROCEDURE — 360N000023 HC SURGERY LEVEL 3 EA 15 ADDTL MIN UMMC: Performed by: OBSTETRICS & GYNECOLOGY

## 2020-10-07 PROCEDURE — 250N000013 HC RX MED GY IP 250 OP 250 PS 637: Performed by: STUDENT IN AN ORGANIZED HEALTH CARE EDUCATION/TRAINING PROGRAM

## 2020-10-07 PROCEDURE — 272N000001 HC OR GENERAL SUPPLY STERILE: Performed by: OBSTETRICS & GYNECOLOGY

## 2020-10-07 PROCEDURE — 999N000016 HC STATISTIC ATTENDANCE AT DELIVERY

## 2020-10-07 PROCEDURE — 761N000003 HC RECOVERY PHASE 1 LEVEL 2 FIRST HR: Performed by: OBSTETRICS & GYNECOLOGY

## 2020-10-07 PROCEDURE — 59514 CESAREAN DELIVERY ONLY: CPT | Mod: GC | Performed by: OBSTETRICS & GYNECOLOGY

## 2020-10-07 PROCEDURE — 258N000003 HC RX IP 258 OP 636: Performed by: ADVANCED PRACTICE MIDWIFE

## 2020-10-07 PROCEDURE — 271N000001 HC OR GENERAL SUPPLY NON-STERILE: Performed by: OBSTETRICS & GYNECOLOGY

## 2020-10-07 PROCEDURE — 250N000011 HC RX IP 250 OP 636: Performed by: ADVANCED PRACTICE MIDWIFE

## 2020-10-07 PROCEDURE — 96372 THER/PROPH/DIAG INJ SC/IM: CPT | Performed by: ADVANCED PRACTICE MIDWIFE

## 2020-10-07 PROCEDURE — C9290 INJ, BUPIVACAINE LIPOSOME: HCPCS | Performed by: STUDENT IN AN ORGANIZED HEALTH CARE EDUCATION/TRAINING PROGRAM

## 2020-10-07 PROCEDURE — BT1DZZZ FLUOROSCOPY OF RIGHT KIDNEY, URETER AND BLADDER: ICD-10-PCS | Performed by: OBSTETRICS & GYNECOLOGY

## 2020-10-07 RX ORDER — OXYTOCIN/0.9 % SODIUM CHLORIDE 30/500 ML
340 PLASTIC BAG, INJECTION (ML) INTRAVENOUS CONTINUOUS PRN
Status: DISCONTINUED | OUTPATIENT
Start: 2020-10-07 | End: 2020-10-09 | Stop reason: HOSPADM

## 2020-10-07 RX ORDER — AMOXICILLIN 250 MG
2 CAPSULE ORAL 2 TIMES DAILY
Status: DISCONTINUED | OUTPATIENT
Start: 2020-10-07 | End: 2020-10-09 | Stop reason: HOSPADM

## 2020-10-07 RX ORDER — BUPIVACAINE HYDROCHLORIDE 2.5 MG/ML
INJECTION, SOLUTION EPIDURAL; INFILTRATION; INTRACAUDAL PRN
Status: DISCONTINUED | OUTPATIENT
Start: 2020-10-07 | End: 2020-10-07

## 2020-10-07 RX ORDER — SIMETHICONE 80 MG
80 TABLET,CHEWABLE ORAL 4 TIMES DAILY PRN
Status: DISCONTINUED | OUTPATIENT
Start: 2020-10-07 | End: 2020-10-09 | Stop reason: HOSPADM

## 2020-10-07 RX ORDER — MISOPROSTOL 200 UG/1
800 TABLET ORAL
Status: DISCONTINUED | OUTPATIENT
Start: 2020-10-07 | End: 2020-10-09 | Stop reason: HOSPADM

## 2020-10-07 RX ORDER — CEFAZOLIN SODIUM 2 G/100ML
2 INJECTION, SOLUTION INTRAVENOUS
Status: COMPLETED | OUTPATIENT
Start: 2020-10-07 | End: 2020-10-07

## 2020-10-07 RX ORDER — HYDROCORTISONE 2.5 %
CREAM (GRAM) TOPICAL 3 TIMES DAILY PRN
Status: DISCONTINUED | OUTPATIENT
Start: 2020-10-07 | End: 2020-10-09 | Stop reason: HOSPADM

## 2020-10-07 RX ORDER — OXYCODONE HYDROCHLORIDE 5 MG/1
5 TABLET ORAL EVERY 4 HOURS PRN
Status: DISCONTINUED | OUTPATIENT
Start: 2020-10-07 | End: 2020-10-09 | Stop reason: HOSPADM

## 2020-10-07 RX ORDER — MISOPROSTOL 200 UG/1
TABLET ORAL PRN
Status: DISCONTINUED | OUTPATIENT
Start: 2020-10-07 | End: 2020-10-07

## 2020-10-07 RX ORDER — CITRIC ACID/SODIUM CITRATE 334-500MG
30 SOLUTION, ORAL ORAL
Status: COMPLETED | OUTPATIENT
Start: 2020-10-07 | End: 2020-10-07

## 2020-10-07 RX ORDER — FENTANYL CITRATE-0.9 % NACL/PF 10 MCG/ML
PLASTIC BAG, INJECTION (ML) INTRAVENOUS CONTINUOUS PRN
Status: DISCONTINUED | OUTPATIENT
Start: 2020-10-07 | End: 2020-10-07

## 2020-10-07 RX ORDER — ACETAMINOPHEN 325 MG/1
975 TABLET ORAL EVERY 6 HOURS
Status: DISCONTINUED | OUTPATIENT
Start: 2020-10-07 | End: 2020-10-09 | Stop reason: HOSPADM

## 2020-10-07 RX ORDER — DIPHENHYDRAMINE HYDROCHLORIDE 50 MG/ML
25 INJECTION INTRAMUSCULAR; INTRAVENOUS EVERY 6 HOURS PRN
Status: DISCONTINUED | OUTPATIENT
Start: 2020-10-07 | End: 2020-10-09 | Stop reason: HOSPADM

## 2020-10-07 RX ORDER — MORPHINE SULFATE 1 MG/ML
INJECTION, SOLUTION EPIDURAL; INTRATHECAL; INTRAVENOUS PRN
Status: DISCONTINUED | OUTPATIENT
Start: 2020-10-07 | End: 2020-10-07

## 2020-10-07 RX ORDER — MODIFIED LANOLIN
OINTMENT (GRAM) TOPICAL
Status: DISCONTINUED | OUTPATIENT
Start: 2020-10-07 | End: 2020-10-09 | Stop reason: HOSPADM

## 2020-10-07 RX ORDER — METHYLERGONOVINE MALEATE 0.2 MG/ML
200 INJECTION INTRAVENOUS
Status: DISCONTINUED | OUTPATIENT
Start: 2020-10-07 | End: 2020-10-09 | Stop reason: HOSPADM

## 2020-10-07 RX ORDER — FENTANYL CITRATE 50 UG/ML
INJECTION, SOLUTION INTRAMUSCULAR; INTRAVENOUS PRN
Status: DISCONTINUED | OUTPATIENT
Start: 2020-10-07 | End: 2020-10-07

## 2020-10-07 RX ORDER — ONDANSETRON 2 MG/ML
4 INJECTION INTRAMUSCULAR; INTRAVENOUS EVERY 6 HOURS PRN
Status: DISCONTINUED | OUTPATIENT
Start: 2020-10-07 | End: 2020-10-09 | Stop reason: HOSPADM

## 2020-10-07 RX ORDER — METHYLERGONOVINE MALEATE 0.2 MG/ML
INJECTION INTRAVENOUS
Status: COMPLETED
Start: 2020-10-07 | End: 2020-10-07

## 2020-10-07 RX ORDER — LIDOCAINE HCL/EPINEPHRINE/PF 2%-1:200K
VIAL (ML) INJECTION PRN
Status: DISCONTINUED | OUTPATIENT
Start: 2020-10-07 | End: 2020-10-07

## 2020-10-07 RX ORDER — SODIUM CHLORIDE, SODIUM LACTATE, POTASSIUM CHLORIDE, CALCIUM CHLORIDE 600; 310; 30; 20 MG/100ML; MG/100ML; MG/100ML; MG/100ML
INJECTION, SOLUTION INTRAVENOUS CONTINUOUS
Status: DISCONTINUED | OUTPATIENT
Start: 2020-10-07 | End: 2020-10-07

## 2020-10-07 RX ORDER — DEXTROSE, SODIUM CHLORIDE, SODIUM LACTATE, POTASSIUM CHLORIDE, AND CALCIUM CHLORIDE 5; .6; .31; .03; .02 G/100ML; G/100ML; G/100ML; G/100ML; G/100ML
INJECTION, SOLUTION INTRAVENOUS CONTINUOUS
Status: DISCONTINUED | OUTPATIENT
Start: 2020-10-07 | End: 2020-10-09 | Stop reason: HOSPADM

## 2020-10-07 RX ORDER — MORPHINE SULFATE 2 MG/ML
INJECTION, SOLUTION INTRAMUSCULAR; INTRAVENOUS PRN
Status: DISCONTINUED | OUTPATIENT
Start: 2020-10-07 | End: 2020-10-07

## 2020-10-07 RX ORDER — CEFAZOLIN SODIUM 1 G/3ML
1 INJECTION, POWDER, FOR SOLUTION INTRAMUSCULAR; INTRAVENOUS SEE ADMIN INSTRUCTIONS
Status: DISCONTINUED | OUTPATIENT
Start: 2020-10-07 | End: 2020-10-07

## 2020-10-07 RX ORDER — OXYTOCIN/0.9 % SODIUM CHLORIDE 30/500 ML
100 PLASTIC BAG, INJECTION (ML) INTRAVENOUS CONTINUOUS
Status: DISCONTINUED | OUTPATIENT
Start: 2020-10-07 | End: 2020-10-09 | Stop reason: HOSPADM

## 2020-10-07 RX ORDER — MISOPROSTOL 200 UG/1
TABLET ORAL
Status: COMPLETED
Start: 2020-10-07 | End: 2020-10-07

## 2020-10-07 RX ORDER — BISACODYL 10 MG
10 SUPPOSITORY, RECTAL RECTAL DAILY PRN
Status: DISCONTINUED | OUTPATIENT
Start: 2020-10-09 | End: 2020-10-09 | Stop reason: HOSPADM

## 2020-10-07 RX ORDER — CARBOPROST TROMETHAMINE 250 UG/ML
250 INJECTION, SOLUTION INTRAMUSCULAR
Status: DISCONTINUED | OUTPATIENT
Start: 2020-10-07 | End: 2020-10-09 | Stop reason: HOSPADM

## 2020-10-07 RX ORDER — PROPOFOL 10 MG/ML
INJECTION, EMULSION INTRAVENOUS CONTINUOUS PRN
Status: DISCONTINUED | OUTPATIENT
Start: 2020-10-07 | End: 2020-10-07

## 2020-10-07 RX ORDER — PROPOFOL 10 MG/ML
INJECTION, EMULSION INTRAVENOUS PRN
Status: DISCONTINUED | OUTPATIENT
Start: 2020-10-07 | End: 2020-10-07

## 2020-10-07 RX ORDER — LIDOCAINE HYDROCHLORIDE 20 MG/ML
INJECTION, SOLUTION INFILTRATION; PERINEURAL PRN
Status: DISCONTINUED | OUTPATIENT
Start: 2020-10-07 | End: 2020-10-07

## 2020-10-07 RX ORDER — OXYTOCIN 10 [USP'U]/ML
10 INJECTION, SOLUTION INTRAMUSCULAR; INTRAVENOUS
Status: DISCONTINUED | OUTPATIENT
Start: 2020-10-07 | End: 2020-10-09 | Stop reason: HOSPADM

## 2020-10-07 RX ORDER — AMOXICILLIN 250 MG
1 CAPSULE ORAL 2 TIMES DAILY
Status: DISCONTINUED | OUTPATIENT
Start: 2020-10-07 | End: 2020-10-09 | Stop reason: HOSPADM

## 2020-10-07 RX ORDER — SODIUM CHLORIDE, SODIUM LACTATE, POTASSIUM CHLORIDE, CALCIUM CHLORIDE 600; 310; 30; 20 MG/100ML; MG/100ML; MG/100ML; MG/100ML
INJECTION, SOLUTION INTRAVENOUS CONTINUOUS PRN
Status: DISCONTINUED | OUTPATIENT
Start: 2020-10-07 | End: 2020-10-07

## 2020-10-07 RX ORDER — IBUPROFEN 800 MG/1
800 TABLET, FILM COATED ORAL EVERY 6 HOURS
Status: CANCELLED | OUTPATIENT
Start: 2020-10-08

## 2020-10-07 RX ORDER — TRANEXAMIC ACID 100 MG/ML
INJECTION, SOLUTION INTRAVENOUS
Status: COMPLETED
Start: 2020-10-07 | End: 2020-10-07

## 2020-10-07 RX ORDER — DIPHENHYDRAMINE HCL 25 MG
25 CAPSULE ORAL EVERY 6 HOURS PRN
Status: DISCONTINUED | OUTPATIENT
Start: 2020-10-07 | End: 2020-10-09 | Stop reason: HOSPADM

## 2020-10-07 RX ORDER — NALOXONE HYDROCHLORIDE 0.4 MG/ML
.1-.4 INJECTION, SOLUTION INTRAMUSCULAR; INTRAVENOUS; SUBCUTANEOUS
Status: DISCONTINUED | OUTPATIENT
Start: 2020-10-07 | End: 2020-10-09 | Stop reason: HOSPADM

## 2020-10-07 RX ORDER — LIDOCAINE 40 MG/G
CREAM TOPICAL
Status: DISCONTINUED | OUTPATIENT
Start: 2020-10-07 | End: 2020-10-07

## 2020-10-07 RX ORDER — ACETAMINOPHEN 325 MG/1
975 TABLET ORAL ONCE
Status: COMPLETED | OUTPATIENT
Start: 2020-10-07 | End: 2020-10-07

## 2020-10-07 RX ORDER — AZITHROMYCIN 500 MG/5ML
500 INJECTION, POWDER, LYOPHILIZED, FOR SOLUTION INTRAVENOUS
Status: COMPLETED | OUTPATIENT
Start: 2020-10-07 | End: 2020-10-07

## 2020-10-07 RX ADMIN — MISOPROSTOL 400 MCG: 200 TABLET ORAL at 13:18

## 2020-10-07 RX ADMIN — SODIUM CHLORIDE, POTASSIUM CHLORIDE, SODIUM LACTATE AND CALCIUM CHLORIDE: 600; 310; 30; 20 INJECTION, SOLUTION INTRAVENOUS at 16:07

## 2020-10-07 RX ADMIN — PROPOFOL 100 MCG/KG/MIN: 10 INJECTION, EMULSION INTRAVENOUS at 15:48

## 2020-10-07 RX ADMIN — BUPIVACAINE HYDROCHLORIDE 30 ML: 2.5 INJECTION, SOLUTION EPIDURAL; INFILTRATION; INTRACAUDAL; PERINEURAL at 16:25

## 2020-10-07 RX ADMIN — TRANEXAMIC ACID 1 G: 100 INJECTION, SOLUTION INTRAVENOUS at 13:11

## 2020-10-07 RX ADMIN — LIDOCAINE HYDROCHLORIDE,EPINEPHRINE BITARTRATE 1 ML: 20; .005 INJECTION, SOLUTION EPIDURAL; INFILTRATION; INTRACAUDAL; PERINEURAL at 13:53

## 2020-10-07 RX ADMIN — DOCUSATE SODIUM 50 MG AND SENNOSIDES 8.6 MG 2 TABLET: 8.6; 5 TABLET, FILM COATED ORAL at 22:03

## 2020-10-07 RX ADMIN — Medication 2.5 MILLION UNITS: at 01:32

## 2020-10-07 RX ADMIN — LIDOCAINE HYDROCHLORIDE,EPINEPHRINE BITARTRATE 5 ML: 20; .005 INJECTION, SOLUTION EPIDURAL; INFILTRATION; INTRACAUDAL; PERINEURAL at 14:32

## 2020-10-07 RX ADMIN — Medication: at 06:35

## 2020-10-07 RX ADMIN — PROPOFOL 50 MG: 10 INJECTION, EMULSION INTRAVENOUS at 15:47

## 2020-10-07 RX ADMIN — Medication 1 G: at 15:43

## 2020-10-07 RX ADMIN — SODIUM CHLORIDE, POTASSIUM CHLORIDE, SODIUM LACTATE AND CALCIUM CHLORIDE: 600; 310; 30; 20 INJECTION, SOLUTION INTRAVENOUS at 12:39

## 2020-10-07 RX ADMIN — LIDOCAINE HYDROCHLORIDE,EPINEPHRINE BITARTRATE 10 ML: 20; .005 INJECTION, SOLUTION EPIDURAL; INFILTRATION; INTRACAUDAL; PERINEURAL at 12:45

## 2020-10-07 RX ADMIN — FENTANYL CITRATE 75 MCG: 50 INJECTION, SOLUTION INTRAMUSCULAR; INTRAVENOUS at 15:05

## 2020-10-07 RX ADMIN — Medication 2.5 MILLION UNITS: at 09:43

## 2020-10-07 RX ADMIN — OXYTOCIN-SODIUM CHLORIDE 0.9% IV SOLN 30 UNIT/500ML 600 ML/HR: 30-0.9/5 SOLUTION at 13:07

## 2020-10-07 RX ADMIN — METHYLERGONOVINE MALEATE 200 MCG: 0.2 INJECTION INTRAMUSCULAR; INTRAVENOUS at 13:13

## 2020-10-07 RX ADMIN — Medication: at 13:26

## 2020-10-07 RX ADMIN — LIDOCAINE HYDROCHLORIDE,EPINEPHRINE BITARTRATE 5 ML: 20; .005 INJECTION, SOLUTION EPIDURAL; INFILTRATION; INTRACAUDAL; PERINEURAL at 15:08

## 2020-10-07 RX ADMIN — SODIUM CHLORIDE, SODIUM LACTATE, POTASSIUM CHLORIDE, CALCIUM CHLORIDE AND DEXTROSE MONOHYDRATE: 5; 600; 310; 30; 20 INJECTION, SOLUTION INTRAVENOUS at 22:36

## 2020-10-07 RX ADMIN — SODIUM CITRATE AND CITRIC ACID MONOHYDRATE 30 ML: 500; 334 SOLUTION ORAL at 12:29

## 2020-10-07 RX ADMIN — LIDOCAINE HYDROCHLORIDE,EPINEPHRINE BITARTRATE 2 ML: 20; .005 INJECTION, SOLUTION EPIDURAL; INFILTRATION; INTRACAUDAL; PERINEURAL at 13:51

## 2020-10-07 RX ADMIN — Medication 2.5 MILLION UNITS: at 05:23

## 2020-10-07 RX ADMIN — METHYLENE BLUE 10 ML: 5 INJECTION INTRAVENOUS at 15:31

## 2020-10-07 RX ADMIN — Medication 2 G: at 12:54

## 2020-10-07 RX ADMIN — LIDOCAINE HYDROCHLORIDE,EPINEPHRINE BITARTRATE 2 ML: 20; .005 INJECTION, SOLUTION EPIDURAL; INFILTRATION; INTRACAUDAL; PERINEURAL at 13:48

## 2020-10-07 RX ADMIN — METHYLENE BLUE 10 ML: 5 INJECTION INTRAVENOUS at 14:25

## 2020-10-07 RX ADMIN — ACETAMINOPHEN 975 MG: 325 TABLET, FILM COATED ORAL at 12:28

## 2020-10-07 RX ADMIN — SODIUM CHLORIDE, POTASSIUM CHLORIDE, SODIUM LACTATE AND CALCIUM CHLORIDE 500 ML: 600; 310; 30; 20 INJECTION, SOLUTION INTRAVENOUS at 08:47

## 2020-10-07 RX ADMIN — Medication 500 G: at 12:56

## 2020-10-07 RX ADMIN — BUPIVACAINE 20 ML: 13.3 INJECTION, SUSPENSION, LIPOSOMAL INFILTRATION at 16:25

## 2020-10-07 RX ADMIN — LIDOCAINE HYDROCHLORIDE,EPINEPHRINE BITARTRATE 5 ML: 20; .005 INJECTION, SOLUTION EPIDURAL; INFILTRATION; INTRACAUDAL; PERINEURAL at 12:39

## 2020-10-07 RX ADMIN — Medication 100 ML/HR: at 17:29

## 2020-10-07 RX ADMIN — MORPHINE SULFATE 2 MG: 2 INJECTION, SOLUTION INTRAMUSCULAR; INTRAVENOUS at 13:55

## 2020-10-07 RX ADMIN — Medication 50 MCG/MIN: at 12:42

## 2020-10-07 RX ADMIN — ACETAMINOPHEN 975 MG: 325 TABLET, FILM COATED ORAL at 18:15

## 2020-10-07 NOTE — PROGRESS NOTES
Labor progress note    S: Pt has been pushing with some effort with RN supports.  Is agreeable to SVE by CNM to assess fetal descent.  Continues to cope well overall with  and partner supports.      O:  Blood pressure 117/74, temperature 98.4  F (36.9  C), temperature source Oral, resp. rate 18, SpO2 98 %.  General appearance: comfortable.  Contractions: Every 2-4 minutes. 40-90 seconds duration.  Palpate: moderate and strong.  Soft resting tone.   FHT: Baseline 125 with moderate variability. Accelerations not present. +intermittent late decelerations  +intermittent variable decelerations present.   ROM: moderate meconium fluid. Membranes have been ruptured for ~40.5 hours.  PELVIC EXAM:10/ 100%/ 0 with caput tp +1.  Asynclitic      Pitocin- 14 mu/min.,  Antibiotics- PCN  IV at maintenance rate  ST for small amount of urine 0800        # Pain Assessment:    - Geraldine is experiencing pain due to labor. Pain management was discussed with Geraldine and her significant other and doual and the plan was created in a collaborative fashion.  Geraldine's response to the current recommendations: engaged  - Coping well with epidural        A:  30 year old  with IUP @ 42w1d second stage labor and minimal/no progress   OOH transfer  ROM ~40 hours, meconium, aferile  Fetal Heart Rate Tracing category two  GBS- positive- adequately treated    Patient Active Problem List   Diagnosis     Meconium in amniotic fluid     Post-term pregnancy, 40-42 weeks of gestation     Pregnancy with prenatal care elsewhere in third trimester     Positive GBS test         P:  Frequent position changes to facilitate labor with epidural anesthesia.  Sifting done and then repositioned laterally with peanut ball. .  Continue intrauterine resuscitation efforts for category 2 FHR tracing.  At this time no concern for fetal metabolic acidemia due to moderate variability.Consult Category 2 FHR tracing algorithm prn if persistent or worsening category 2 FHR  tracing despite interventions remote from delivery.   Reevaluate in 30 minutes and  prn       Marleny JOHNSON, CNM

## 2020-10-07 NOTE — PLAN OF CARE
Prolong decel 4 minutes down to 90s noted; pitocin stopped, bolus. BARBIE Cornejo notified and in room. FHR return to baseline. Cares explained. Pt comfortable with epidural.

## 2020-10-07 NOTE — PROVIDER NOTIFICATION
10/07/20 1743   Provider Notification   Provider Name/Title Dr. Bianchi   Method of Notification Electronic Page   Request Evaluate-Remote   Notification Reason Vital Signs Change   Notified of temperatures in PACU 100.0F, 99.5F, 99.4F 30 minutes apart.

## 2020-10-07 NOTE — ANESTHESIA POSTPROCEDURE EVALUATION
Anesthesia POST Procedure Evaluation    Patient: Geraldine Neves   MRN:     [id number] Gender:   female   Age:    30 year old :      1990        Preoperative Diagnosis: * No pre-op diagnosis entered *   Procedure(s):   SECTION  Cystoscopy   Postop Comments: No value filed.     Anesthesia Type: Spinal, Epidural, Peripheral Nerve Block          Postop Pain Control: Uneventful            Sign Out: Well controlled pain   PONV: No   Neuro/Psych: Uneventful            Sign Out: Acceptable/Baseline neuro status   Airway/Respiratory: Uneventful            Sign Out: Acceptable/Baseline resp. status   CV/Hemodynamics: Uneventful            Sign Out: Acceptable CV status   Other NRE: NONE   DID A NON-ROUTINE EVENT OCCUR? No         Last Anesthesia Record Vitals:  CRNA VITALS  10/7/2020 1555 - 10/7/2020 1648      10/7/2020             Temp:  37.8  C (100  F)          Last PACU Vitals:  Vitals Value Taken Time   /82 10/07/20 1645   Temp     Pulse 83 10/07/20 1648   Resp 22 10/07/20 1648   SpO2 97 % 10/07/20 1648   Temp src     NIBP     Pulse     SpO2     Resp     Temp 37.8  C (100  F) 10/07/20 1628   Ht Rate     Temp 2     Vitals shown include unvalidated device data.      Electronically Signed By: Olegario Paez MD, 2020, 4:48 PM  
5

## 2020-10-07 NOTE — OP NOTE
2020      PREOPERATIVE DIAGNOSIS:  Concern for distal ureteral injury  POSTOPERATIVE DIAGNOSIS:  No ureteral injury noted    PROCEDURES PERFORMED:   1. Cystourethroscopy  2. Bilateral retrograde pyelograms    STAFF SURGEON: Berry Chacon MD    RESIDENT(S):  Johanna Sánchez MD    ANESTHESIA: Epidural    ESTIMATED BLOOD LOSS: None    DRAINS: None    OPERATIVE INDICATIONS:   Geraldine Neves is a(n) 30 year old undergoing urgent  today. Intraoperative consult was placed by Dr. Lopez to Urology due to no efflux bilaterally from ureteral orifices on Dr. Lopez's post  cystoscopy. Please see Dr. Lopez's note for details.    DESCRIPTION OF PROCEDURE:   Patient was in dorsal lithotomy, prepped and draped, with epidural, IVs, and mild sedation at start of Urology portion.    22 Nepalese rigid cystoscope was introduced per well lubricated urethra. The bladder media was clear. There was no visible sutures or visible injury to the bladder. The urethra was unremarkable. Bilateral ureteral orifices were orthotopic. There was some motion of bilateral ureteral orifices with respiration but no efflux of fluid. Methylene blue was also given with no visible efflux. The bladder was emptied and the ureteral orifices observed for several minutes still without efflux.     Therefore retrograde pyelograms were performed. 5 Nepalese open ended catheter was introduced to the distal left UO. It advanced easily. A distal pyelogram noted good opacification of the left ureter without extravasation. The  5 Nepalese was advanced further with no resistance and pyelogram to mid-ureter performed. The catheter was removed and a post drainage image taken. The ureter drained contrast well and immediate repeat image showed no retained contrast. The same procedure was repeated on the right. Again there was good opacification of the distal ureter without extravasation, there was immediate visible drainage of contrast upon  removal of the catheter and immediate post contrast film showed complete drainage of the contrast.     Then upon completing this - patient had received further fluid bolus prior to Urology procedure start  - efflux was then noted bilaterally.    We turned the case back over the Dr. Lopez's team.    - No need for any follow up.  - Valadez placement/management per primary team. Okay if patient is observed to have green/blue urine.

## 2020-10-07 NOTE — ANESTHESIA CARE TRANSFER NOTE
Patient: Geraldine Neves    Procedure(s):   SECTION  Cystoscopy    Diagnosis: * No pre-op diagnosis entered *  Diagnosis Additional Information: No value filed.    Anesthesia Type:   Spinal, Epidural, Peripheral Nerve Block     Note:  Airway :Room Air  Patient transferred to:Labor and Delivery  Handoff Report: Identifed the Patient, Identified the Reponsible Provider, Reviewed the pertinent medical history, Discussed the surgical course, Reviewed Intra-OP anesthesia mangement and issues during anesthesia, Set expectations for post-procedure period and Allowed opportunity for questions and acknowledgement of understanding      Vitals: (Last set prior to Anesthesia Care Transfer)    CRNA VITALS  10/7/2020 1555 - 10/7/2020 1629      10/7/2020             Temp:  37.8  C (100  F)                Electronically Signed By: ALEX Cruz CRNA  2020  4:29 PM

## 2020-10-07 NOTE — OP NOTE
[facility]  Full Operative Progress Note     Surgery Date:  10/7/2020    Surgeon:  Lucero Lopez MD    Assistants:  Gilberto Bianchi MD PGY-2    Theo Barba MS3     Pre-op Diagnosis:    - Intrauterine pregnancy at 42w1d  - Arrest of descent    - Prolonged rupture of membranes >36 hours  - Anuria    Post-op Diagnosis:    - Same   - Liveborn male infant     Procedure: Primary low-transverse  section with double layer uterine closure via pfannenstiel incision and cystoscopy  -s/p Intra-op Urology consult who performed cystoscopy, bilateral retrograde pyelograms (see their op note for more details)    Anesthesia: Spinal  EBL: 1910 ml, TXA, methergine, misoprostol and pitocin given intra-op  IVF: 3500 mL crystalloid  UOP: 400 mL blood tinged urine at the end of the case  Drains: Valadez Catheter   Specimens: Routine cord blood, cord segment  Complications: None apparent    Indications:   Geraldine Neves is a 30 year old  at 42w1d admitted to the [facility] service with prelabor rupture of membranes. Labor was induced with pitocin. Patient reached complete dilation and began pushing. However, despite good maternal effort, no descent of fetal head was noted after >2 hours of pushing and 6 hours after being completely dilated. A  section was recommended. The risks, benefits, and alternatives of  section were discussed with the patient including bleeding, infection, injury to surrounding structures (nerves, blood vessels, uterus, cervix, tubes, ovaries, bladder, bowel, rarely baby), and she agreed to proceed. Written consent obtained.     Findings:   1. Minimal amniotic fluid  2. Liveborn male infant in left occiput posterior presentation. Apgars 9 at 1 minute & 9 at 5 minutes. Weight 3340g.  3. Right extension of the hysterotomy into the broad ligament. No cervix or bladder involvement on the right. Left extension of the hysterotomy into the cervix requiring several  stitches for hemostasis. Cystoscopy performed with no noted efflux from bilateral UO. Lasix 5 mg given twice along with fluid bolus (1500 ml)  and methylene blue with no noted efflux. Bladder otherwise appeared normal with no suture seen within the bladder dome. Bilateral UO visualized to be vermiculating with no efflux. Dr. Solo called intra-op and she concurred that hemostasis had been achieved for the hysterotomy and that no efflux was seen from the UO. Therefore, Urology was called to the OR for an intra-op consult. See their OP note for details. Bilateral distal ureters were determined to be patent by Urology. Otherwise normal uterus, fallopian tubes, and ovaries. Recommend cervical length screening starting at 16 weeks for future pregnancy.    Procedure Details:   The patient was brought to the OR, where adequate spinal anesthesia was administered.  She was placed in the dorsal lithotomy position with a slight leftward tilt. She was prepped and draped in the usual sterile fashion. A surgical time out was performed. A pfannenstiel skin incision was made with the scalpel, and carried down to the underlying fascia with sharp and blunt dissection. The fascia was incised in the midline, and the incision was extended laterally with the Giordano scissors. The superior aspect of the fascia was grasped with the Kocher clamps and dissected off of the underlying rectus muscles with blunt and sharp dissection. Attention was then turned to the inferior aspect of the fascia, which was similarly dissected off of the underlying rectus muscles. The rectus muscles were  in the midline, and the peritoneum was entered bluntly, and the opening was extended with digital pressure and electrosurgery. The bladder blade was placed. A transverse hysterotomy was made with the scalpel in the lower uterine segment, and the incision was extended with digital pressure. The infant was noted to be in the left occiput posterior  position, and was delivered atraumatically. The shoulders delivered easily. No nuchal cord was noted. The cord was doubly clamped and cut after 60 seconds, and the infant was handed off to the awaiting NICU staff. A segment of cord was cut and collected. The placenta was delivered with gentle traction on the umbilical cord and uterine massage. The uterus was exteriorized and cleared of all clots and debris. Uterine tone was noted to be boggy with pitocin given through the running IV and uterine massage. Therefore, TXA and methergine and misoprostol were given. Bilateral extensions of the hysterotomy were noted with the above findings. The hysterotomy was closed with a running locked suture of 0 Vicryl. The left extension into the cervix required several stitches to obtain hemostasis. The hysterotomy was then imbricated using an 0 Monocryl suture. The hysterotomy was noted to be hemostatic. The posterior cul-de-sac was cleared of all clots and debris. The uterus was returned to the abdomen. The pericolic gutters were cleared of all clots and debris. The hysterotomy was reexamined and noted to be hemostatic. Floseal was placed on the left side of the hysterotomy. The fascia and rectus muscles were examined and areas of oozing were controlled with electrocautery. At this point, attention was turned to the cystoscopy portion in order ensure no ureteral injury given the extension of the hysterotomy towards the cervix requiring multiple stitches. Cystoscope was introduced into the urethra after betadine prep. The above findings were noted despite methylene blue, lasix 5mg IV x2, and fluid bolus. Dr. Solo was called for the above reason. Urology was called for intraop consult. After Urology was finished with their portion of the case and bilateral ureter patency was certain, the fascia was closed with a running 0 Vicryl suture. The subcutaneous tissue was irrigated and areas of oozing were controlled with  electrocautery. The subcutaneous tissue was less than 2 cm in thickness, and was therefore not closed. The skin was closed with staples and covered with a sterile dressing.    All sponge, needle, and instrument counts were correct. The patient tolerated the procedure well, and was transferred to recovery in stable condition. Dr. Lopez was present and scrubbed for the entirety of the procedure.     Gilberto Bianchi MD  OB/GYN PGY-2  10/07/20 4:41 PM    Staff:  I was scrubbed and present for entire case and agree w/ above note.    Lucero Lopez MD

## 2020-10-07 NOTE — PROGRESS NOTES
[facility] Labor and Delivery Progress Note    Geraldine Neves MRN# [id number]   Age: 30 year old YOB: 1990           Subjective:   Called to bedside by RN. RN unable to get urine return with catheterization and FHR not tolerating maternal positioning for catheter. Patient states she feels relief from contractions and is feeling some light pressure. She states she is feeling increased anxiety related to lying in bed and is anxious to get up out of bed after delivery.            Objective:     Patient Vitals for the past 24 hrs:   BP Temp Temp src Resp SpO2 Oximeter Heart Rate   10/07/20 0230 121/73 98.5  F (36.9  C) Oral 18 95 % 91 bpm   10/07/20 0200 115/67 98  F (36.7  C) Oral 18 97 % 90 bpm   10/07/20 0130 113/66 -- -- 18 96 % 77 bpm   10/07/20 0100 110/66 98  F (36.7  C) -- 18 97 % 74 bpm   10/07/20 0030 112/64 98.2  F (36.8  C) Oral 18 90 % 86 bpm   10/06/20 2357 105/67 -- -- 18 -- 79 bpm   10/06/20 2348 111/58 -- -- 18 95 % 92 bpm   10/06/20 2345 113/58 -- -- 18 95 % 77 bpm   10/06/20 2338 109/58 -- -- 18 97 % 89 bpm   10/06/20 2330 106/58 98.7  F (37.1  C) Oral 18 98 % 93 bpm   10/06/20 2326 108/59 -- -- 18 97 % 88 bpm   10/06/20 2324 119/65 -- -- 18 97 % 89 bpm   10/06/20 2322 124/74 -- -- -- -- --   10/06/20 2320 116/72 -- -- -- -- --   10/06/20 2318 134/69 -- -- -- -- --   10/06/20 2203 -- 97.9  F (36.6  C) Oral 18 -- --   10/06/20 2035 124/77 98.4  F (36.9  C) Oral 20 -- 90 bpm   10/06/20 1945 -- 98.3  F (36.8  C) Oral 19 -- --   10/06/20 1815 -- 98.4  F (36.9  C) Oral 16 -- --   10/06/20 1626 123/70 98.4  F (36.9  C) Oral 18 -- --   10/06/20 1434 -- 98.3  F (36.8  C) -- -- -- --   10/06/20 1254 -- 97.5  F (36.4  C) Oral -- -- --   10/06/20 1046 112/70 98.4  F (36.9  C) Oral -- -- 72 bpm   10/06/20 0950 -- 98.4  F (36.9  C) Oral -- -- --   10/06/20 0912 -- 98.4  F (36.9  C) Oral -- -- --   10/06/20 0827 -- 97.9  F (36.6  C) Oral -- -- --   10/06/20 0727 -- 98  F (36.7  C) Oral  16 -- --   10/06/20 0700 120/75 98.2  F (36.8  C) Oral 18 -- 80 bpm   10/06/20 0300 -- 98.1  F (36.7  C) Oral -- -- --       Cervical Exam: 9 / 90% / 2      Position: mid    Membranes: SROM Leaking with thick meconium    Fetal Heart Rate:    Monitor: external US    Variability: moderate (amplitude range 6 to 25 bpm)    Baseline Rate: 130's    Fetal Heart Rate Tracing: Category 2. Early decelerations noted and corrected with repositioning and decreased Pitocin.    Contractions   Frequency: 2-6 min   Duration: 60-90 sec   Intensity: strong by palpation    Pitocin is at 12mU, turned down to 6mU due to fetal intolerance.          Assessment:   Geraldine Neves is a 30 year old  who is 42w1d here IOL for thick meconium and post-dates with Pitocin. Epidural in place with good pain coverage. +3 non-pitting edema noted in LE. Unable to void.          Plan:   Recommended SVE to assess progress. Patient agreeable to this plan. Moderate amount of caput noted on exam.  Unable to obtain urine return with catheter r/t fetal head positioning. Plan to attempt immediately after delivery   Continue labor induction with decreased dose of Pitocin  Continue to reposition for comfort and FHR tracing management.  Pain medication: epidural  Prophylactic antibiotic for + GBS status  Graduated compression sleeves applied to b/l LE.    IMadie SNM am serving as a scribe; to document services personally performed by  Lana Cornejo CNM based on data collection and the provider's statements to me.     JAMES Parsons    I agree with the PFSH and ROS as completed by the student, except for changes made by me. The remainder of the encounter was performed by me and scribed by the student. The scribed note accurately reflects my personal services and decisions made by me.  Dixie Cornejo, ESVIN, BARBIE, APRN

## 2020-10-07 NOTE — PROGRESS NOTES
[facility] Labor and Delivery Progress Note    Geraldine Neves MRN# [id number]   Age: 30 year old YOB: 1990           Subjective:   Called to room by RN to assess bladder status. Pt states she feels like she needs to urinate. Previous attempts at straight cath blocked by fetal head positioning.           Objective:     Patient Vitals for the past 24 hrs:   BP Temp Temp src Resp SpO2 Oximeter Heart Rate   10/07/20 0330 119/69 98  F (36.7  C) Oral 18 96 % 83 bpm   10/07/20 0230 121/73 98.5  F (36.9  C) Oral 18 95 % 91 bpm   10/07/20 0200 115/67 98  F (36.7  C) Oral 18 97 % 90 bpm   10/07/20 0130 113/66 -- -- 18 96 % 77 bpm   10/07/20 0100 110/66 98  F (36.7  C) -- 18 97 % 74 bpm   10/07/20 0030 112/64 98.2  F (36.8  C) Oral 18 90 % 86 bpm   10/06/20 2357 105/67 -- -- 18 -- 79 bpm   10/06/20 2348 111/58 -- -- 18 95 % 92 bpm   10/06/20 2345 113/58 -- -- 18 95 % 77 bpm   10/06/20 2338 109/58 -- -- 18 97 % 89 bpm   10/06/20 2330 106/58 98.7  F (37.1  C) Oral 18 98 % 93 bpm   10/06/20 2326 108/59 -- -- 18 97 % 88 bpm   10/06/20 2324 119/65 -- -- 18 97 % 89 bpm   10/06/20 2322 124/74 -- -- -- -- --   10/06/20 2320 116/72 -- -- -- -- --   10/06/20 2318 134/69 -- -- -- -- --   10/06/20 2203 -- 97.9  F (36.6  C) Oral 18 -- --   10/06/20 2035 124/77 98.4  F (36.9  C) Oral 20 -- 90 bpm   10/06/20 1945 -- 98.3  F (36.8  C) Oral 19 -- --   10/06/20 1815 -- 98.4  F (36.9  C) Oral 16 -- --   10/06/20 1626 123/70 98.4  F (36.9  C) Oral 18 -- --   10/06/20 1434 -- 98.3  F (36.8  C) -- -- -- --   10/06/20 1254 -- 97.5  F (36.4  C) Oral -- -- --   10/06/20 1046 112/70 98.4  F (36.9  C) Oral -- -- 72 bpm   10/06/20 0950 -- 98.4  F (36.9  C) Oral -- -- --   10/06/20 0912 -- 98.4  F (36.9  C) Oral -- -- --   10/06/20 0827 -- 97.9  F (36.6  C) Oral -- -- --   10/06/20 0727 -- 98  F (36.7  C) Oral 16 -- --   10/06/20 0700 120/75 98.2  F (36.8  C) Oral 18 -- 80 bpm         Cervical Exam: 9-10 / 100% / 2       Position: Anterior    Membranes: SROM Leaking with thick meconium    Fetal Heart Rate:    Monitor: external US    Variability: moderate (amplitude range 6 to 25 bpm)    Baseline Rate: 135    Fetal Heart Rate Tracing:   Contractions    Every 2-4 min lasting 60-90 seconds and strong on palpation. Soft resting uterine tone.    Pitocin 8 units          Assessment:   Geraldine Neves is a 30 year old  who is 42w1d here with IOL for thick meconium and post-dates. Attempted to bladder scan but unable to gain an accurate result. Five minute decel to the 90's at 0435 resolved with repositioning.           Plan:   50 mL concentrated urine obtained with straight catheter.   PCR ordered.  Labor induction with Pitocin  Pain medication: epidural in place with good pain management.  Prophylactic antibiotic for + GBS status  Continue with graduated compression sleeves.  Continue frequent repositioning    Madie ALVAREZ SNM am serving as a scribe; to document services personally performed by  Lana Cornejo CNM based on data collection and the provider's statements to me.     JAMES Parsons

## 2020-10-07 NOTE — PROVIDER NOTIFICATION
10/06/20 2230   Provider Notification   Provider Name/Title Clemente. BARBIE and Student   Method of Notification In Department   Notification Reason Status Update   Clemente VALENTIN updated on patient's request for epidural.

## 2020-10-07 NOTE — PROGRESS NOTES
Labor progress note    S:  Patient coping with contractions with good support from partner and  at bedside. Using NO effectively and vocalizing through contractions. Describes contractions as strong and painful, greater in the front than back. Open to Leopold's maneuver's to assess fetal positioning. Declines cervical exam at this time.     O:  Blood pressure 124/77, temperature 98.4  F (36.9  C), temperature source Oral, resp. rate 20.  General appearance: uncomfortable with contractions.  Contractions: Every 2-4 minutes.  seconds duration.  Palpate: strong.  FHT: Baseline 120 with moderate variability. Accelerations absent. late decelerations present.  ROM: thick meconium fluid. Membranes have been ruptured for 28 hours.  Pelvic exam: deferred    -------------------------------    Pitocin- 6 mu/min.,  Antibiotics- PCN    A:  IUP @ 42w0d active labor   Fetal Heart rate tracing Category category two  GBS- positive  Patient Active Problem List   Diagnosis     Meconium in amniotic fluid     Post-term pregnancy, 40-42 weeks of gestation     Pregnancy with prenatal care elsewhere in third trimester     Positive GBS test     P:  500 mL LR bolus for category 2 FHR tracing. FHR now category 1 and documented in labor flowsheet.  Leopold's manuevers demonstrate LOP. Encouraged frequent position changes to encourage rotation.  Comfort measures prn, encouraged therapuetic rest between contractions.  Pain medication: declines at this time  Continue PCN for GBS treatment.  Labor induction with Pitocin  reevaluate in 2-4 hours/PRN     IMadie SNM am serving as a scribe; to document services personally performed by  Lana Cornejo CNM based on data collection and the provider's statements to me.     JAMES Parsons    I agree with the PFSH and ROS as completed by the student, except for changes made by me. The remainder of the encounter was performed by me and scribed by the student. The scribed note accurately  reflects my personal services and decisions made by me.  Dixie Cornejo, DNP, CNM, APRN

## 2020-10-07 NOTE — PLAN OF CARE
Valadez placed. No urine return. CNM and MD aware of situation. Continue close observation. Will proceed with c/s.

## 2020-10-07 NOTE — PROGRESS NOTES
Geraldine Neves      MRN#: [id number]  Age: 30 year old      YOB: 1990      CNM Social Rounds  Pt in PACU, holding infant skin to skin.   She is tired but engaged and had good questions about all that happened post delivery in OR. She is thankful to be done but will have a lot to unpack as she pieces together her labor and delivery.  All her questions were discussed and answered.  Reviewed CNM social rounds/involvement postpartum. Pt verbalized understanding of CNM social role.   Pt and her partner has no unidentified unmet needs at this time.  Marleny JOHNSON, ANAM

## 2020-10-07 NOTE — PROGRESS NOTES
Asked to consult on pt who has been complete for 6 hrs w/o descent of fetal head. She has pushed for >2 hrs.  Having intermittent cat 2 tracing w/ pushing.  Pitocin since turned off d/t same.    /74   Temp 98.4  F (36.9  C) (Oral)   Resp 18   SpO2 98%   Complete  FHT: cat 1 when not on pitocin.        Intake/Output Summary (Last 24 hours) at 10/7/2020 1232  Last data filed at 10/7/2020 0835  Gross per 24 hour   Intake 3554 ml   Output 101 ml   Net 3453 ml     A/p: Failure to descend over several hours. Recommend c/s delivery. Ancef and azithromycin ordered. Anesthesia team consulted.  Consent obtained.     Lucero Lopez MD, FACOG  (she/her/hers)    Department of Ob/Gyn/Women's Health  [facility] [employer]  [facility]  [address] [place] [address]  [email or web address]  p. [phone]  f. [phone]  10/7/2020  12:41 PM

## 2020-10-07 NOTE — PROGRESS NOTES
Geraldine Neves      MRN#: [id number]  Age: 30 year old      YOB: 1990      CNM Social Rounds  Pt still in OR with urology and OB teams.  Pt is now awake and engaged. Partner with baby in PACU, given supports.   Reviewed CNM social rounds/involvement postpartum. Pt verbalized understanding of CNM social role.   CNM will check in again once pt in PACU.  Marleny JOHNSON, BARBIE

## 2020-10-07 NOTE — PLAN OF CARE
Pt not pushing effectively. CNM in to evaluate. St cath, rebozzo, IV bolus and peanut ball. Pt will stop pushing and rest for a bit. Continue to closely monitor.

## 2020-10-07 NOTE — ANESTHESIA PROCEDURE NOTES
Peripheral Nerve Block Procedure Note      Staff -   Anesthesiologist:  John Murcia MD  Performed By: anesthesiologist  Location: OB  Procedure Start/Stop TImes:      10/7/2020 4:13 PM    patient identified, IV checked, risks and benefits discussed, informed consent, monitors and equipment checked, pre-op evaluation, at physician/surgeon's request and post-op pain management      Correct Patient: Yes      Correct Position: Yes      Correct Site: Yes      Correct Procedure: Yes      Correct Laterality:  Yes    Site Marked:  Yes and No  Procedure details:     Procedure:  Quadratus lumborum    ASA:  2    Laterality:  Bilateral    Position:  Supine    Sterile Prep: chloraprep      Needle:  Short bevel    Needle gauge:  21    Needle length (inches):  4    Ultrasound: Yes      Ultrasound used to identify targeted nerve, plexus, or vascular structure and placed a needle adjacent to it      Permanent Image entered into patiient's record      Abnormal pain on injection: No      Blood Aspirated: No      Paresthesias:  No    Bleeding at site: No      Bolus via:  Needle    Infusion Method:  Single Shot    Blood aspirated via catheter: No    Assessment/Narrative:      TAP Block

## 2020-10-07 NOTE — PLAN OF CARE
Afebrile, VSS. Pt has been rupture for 37 hours, thick, dark meconium. EFM as charted. Ctx regularly on Pitocin, at 9 mU/hr. Laboring down. Comfortable with epidural. Anticipate . Continue to monitor.

## 2020-10-07 NOTE — PROGRESS NOTES
Blood pressure 124/77, temperature 97.9  F (36.6  C), temperature source Oral, resp. rate 18.  General appearance: uncomfortable with contractions    Called to room by RN for prolonged decel.    CONTRACTIONS: Contractions every 2-3 minutes.  Palpate: strong  Pitocin- none,  Antibiotics- PCN  FETAL HEART TONES: baseline 120 with moderate FHR variability and  pos accelerations. Prolonged deceleration present lasting 2 minutes.    ___________________  Baseline rate: 130  Regular heart rate rhythm  Increases in fetal heart rate Present  Decreases in fetal heart rate Present. Two minute deceleration to 90 with marked variability noted. Now recovered to baseline 120's with moderate variability.  Interventions performed: maternal positioning and LR bolus and stop Pitocin   Uterine activity: Contraction frequency 2-3, length  sec, intensity strong,palpate soft between contractions    FHTs auscultated by nurse while provider at bedside    Assessment:   Abnormal fetal heart rate    Uterine Activity: normal uterine activity   ______________________________    ROM: thick meconium fluid  PELVIC EXAM:deferred  # Pain Assessment:  Current Pain Score 10/6/2020   Patient currently in pain? yes   Geraldine quinones pain level was assessed and she currently IS experiencing pain. Epidural just placed.        ASSESSMENT:  ==============  IUP @ 42w0d minimal/no progress   Fetal Heart rate tracing  category two. Recovered to baseline now with moderate variability and positive accelerations.  GBS- positive    Patient Active Problem List   Diagnosis     Meconium in amniotic fluid     Post-term pregnancy, 40-42 weeks of gestation     Pregnancy with prenatal care elsewhere in third trimester     Positive GBS test     PLAN:  ===========  Continue with LR bolus. Restart Pitocin per  Protocol.  Pain medication: epidural in place  Theraputic sleep  Prophylactic antibiotic for + GBS status  Labor induction with Pitocin  reevaluate in 2-4 hours/PRN      I, JAMES Parsons am serving as a scribe; to document services personally performed by  Lana Cornejo CNM based on data collection and the provider's statements to me.     JAMES Parsons    I agree with the PFSH and ROS as completed by the student, except for changes made by me. The remainder of the encounter was performed by me and scribed by the student. The scribed note accurately reflects my personal services and decisions made by me.  Dixie Cornejo, ESVIN, BARBIE, APRN

## 2020-10-07 NOTE — PROGRESS NOTES
Labor progress note    S: Pt has been giving strong pushing effort since ~1000. Plan was to push for an hour and then reassess.  Using position changes - tug of war, R lateral, supported hands and knees.     O:  Blood pressure 117/74, temperature 98.4  F (36.9  C), temperature source Oral, resp. rate 18, SpO2 98 %.  General appearance: comfortable.  Contractions: Every 3-4 minutes. 40-60 seconds duration.  Palpte: moderate.  Soft resting tone.   FHT: Baseline 130 with moderate variability. Accelerations not present. +prolonged decelerations present.  +variable decelerations  ROM: moderate meconium fluid. Membranes have been ruptured for 41 hours.  PELVIC EXAM:10/ 100%/0 with caput to +1.  Asynclitic    Pitocin- OFF at 1013, restarted at 1020 8 mu/min.,  Antibiotics- PCN  IV at maintenance rate  Due for ST by 3 hours (1100)  Epidural in situ      # Pain Assessment:    - Geraldine is experiencing pain due to labor. Pain management was discussed with Geraldine and her significant other and  and the plan was created in a collaborative fashion.  Geraldine's response to the current recommendations: engaged  - Coping well overall        A:  30 year old  with IUP @ 42w1d second stage labor and minimal/no progress   ROM ~41 hours, meconium, afebrile  Fetal Heart Rate Tracing category two  GBS- positive- adequately treated  OOH transfer  Minimal urine output    Patient Active Problem List   Diagnosis     Meconium in amniotic fluid     Post-term pregnancy, 40-42 weeks of gestation     Pregnancy with prenatal care elsewhere in third trimester     Positive GBS test         P:  Frequent position changes to facilitate labor with epidural anesthesia.  Continue CNM guided pushing  At this time no concern for fetal metabolic acidemia due to moderate variability.Consult MD and consider operative delivery prn if persistent or worsening category 2 FHR tracing despite interventions remote from delivery.   Due for ST ~1100.    Reviewed pt  with John BASILIO during safety board sign out, in agreement appropriate to recommend C/section based on minimal urine output if remains remote from delivery with next SVE. Pt and her supports aware of close observation.   Reevaluate in 30 minutes and prn     Marleny JOHNSON CNM          ADDENDUM 10/7/2020 11:33 AM  - No descent after strong hour of active pushing with strong maternal effort and CNM directed pushing.  Fetus remains asynclitic.  No urine with del angel placement.  Reivewed strong recommendation for c/section with pt and her supports.  Allowed private space to discuss. Pt and her supports in agreement. Anesthesia resident updated.  OB resident paged.  John BASILIO paged.  Charge RN updated. NICU updated.   Transfer to MD cares.   Continue social cares with midwife services. ALEX Alvarenga CNM

## 2020-10-07 NOTE — PLAN OF CARE
and  remain at bedside throughout the evening. Patient has been changing positions from standing, squatting, lunging. Patient states her uterus hurts to lay in bed and prefers to be up. Vitals stable with no fever. Nitrous oxide, essential oils, and position changes for pain management. Labor progressing to 3/80/-1.

## 2020-10-07 NOTE — PROGRESS NOTES
Blood pressure 124/77, temperature 97.9  F (36.6  C), temperature source Oral, resp. rate 18.  General appearance: uncomfortable with contractions    Called to bedside by patient. Patient appears uncomfortable and is unable to rest between contractions. Requesting Leopold's maneuvers and cervical exam.  and partner supportive at bedside.    CONTRACTIONS: Contractions every 2-4 minutes.  Palpate: strong  Pitocin- 6 mu/min.,  Antibiotics- PCN  FETAL HEART TONES: baseline 130 with moderate FHR variability and  pos accelerations.  No decelerations present.    ___________________  IA: Patient does NOT meet criteria. Continuous external monitoring per protocol.  Baseline rate: 130  Regular heart rate rhythm  Increases in fetal heart rate Present  Decreases in fetal heart rate Not Present  Interventions performed: No interventions needed at this time   Uterine activity: Contraction frequency 2-4, length  sec, intensity strong, resting time 60-90 sec,palpate soft between contractions    FHTs auscultated by nurse while provider at bedside    Assessment:   Normal fetal heart rate    Uterine Activity: normal uterine activity   ______________________________    ROM: thick meconium fluid  PELVIC EXAM:PELVIC EXAM: 3/ 80%/ Anterior/ soft/ -1   # Pain Assessment:  Current Pain Score 10/6/2020   Patient currently in pain? yes   - Geraldine is experiencing pain due to labor contractions. Pain management was discussed with Geraldine and her spouse and  and the plan was created in a collaborative fashion.  Geraldine's response to the current recommendations: engaged    ASSESSMENT:  ==============  IUP @ 42w0d early labor   Fetal Heart rate tracing  category one  GBS- positive  Thick meconium still present on patients pad.    Patient Active Problem List   Diagnosis     Meconium in amniotic fluid     Post-term pregnancy, 40-42 weeks of gestation     Pregnancy with prenatal care elsewhere in third trimester     Positive GBS test         PLAN:  ===========  Comfort measures prn. Patient continues to use NO and vocalization through contractions. Is unable to rest between contractions and states that she is becoming exhausted and discouraged from lack of progress.    Patient requested information on pain management options. Discussed IV narcotic and epidural including risks and benefits. Discussed that she would be repositioned frequently in the bed if she chose and epidural. Requesting privacy to discuss options with spouse and . Pain medication: IV narcotics and epidural options discussed.    Encouraged therapeutic rest.    Prophylactic antibiotic for + GBS status    Labor induction with Pitocin     I, JAMES Parsons am serving as a scribe; to document services personally performed by  Lana Cornejo CNM based on data collection and the provider's statements to me.     JAMES Parsons    I agree with the PFSH and ROS as completed by the student, except for changes made by me. The remainder of the encounter was performed by me and scribed by the student. The scribed note accurately reflects my personal services and decisions made by me.  Dixie Cornejo, ESVIN, BARBIE, APRN    Addendum 1100 - Pt is requesting epidural at this time, MDA notified.

## 2020-10-07 NOTE — PROGRESS NOTES
Labor progress note    S: Pt noted in supported hands and knees position, coping well overall but feeling a lot of hip discomfort.  Agreeable to sifting while in this position.  Pt's partner and  at bedside, supportive.     O:  Blood pressure 118/69, temperature 98  F (36.7  C), temperature source Oral, resp. rate 18, SpO2 97 %.  General appearance: uncomfortable with contractions but coping well with supports  Contractions: Every 1.5-4 minutes.  seconds duration.  Palpate: moderate and strong.  Soft resting tone.   FHT: Baseline 130 bpm with moderate variability. Accelerations are present. +early decelerations present.  ROM: moderate meconium fluid. Membranes have been ruptured for ~37 hours.  PELVIC EXAM:deferred      Pitocin- 9 mu/min.,  Antibiotics- PCN  IV at maintenance rate  SCD boots in place        # Pain Assessment:    - Geraldine is experiencing pain due to hip pain with contractions. Pain management was discussed with Geraldine and her significant other and  and the plan was created in a collaborative fashion.  Geraldine's response to the current recommendations: mixed response  - Coping overall with epidural and support from /partner.  Is resistant at times to counter pressure but open to consider sifting/rebozzo        A:  30 year old  with IUP @ 42w1d active labor   OOH transfer  ROM ~37 hours, meconium. Afebrile  Fetal Heart Rate Tracing category one  GBS- positive- adequately treated    Patient Active Problem List   Diagnosis     Meconium in amniotic fluid     Post-term pregnancy, 40-42 weeks of gestation     Pregnancy with prenatal care elsewhere in third trimester     Positive GBS test         P:  Frequent position changes to facilitate labor with epidural anesthesia.  Begin pushing with RN support given CNM other clinical duties  Continue labor augmentation with Pitocin per protocol  Continue prophylactic IV antibiotic PCN for positive GBS status.   At this time no concern for fetal  metabolic acidemia due to moderate variability.Consult category 2 FHR tracing algorithm prn if worsening or persistent category 2 tracing despite interventions remote from delivery.     Plan NICU for delivery given meconium and Cat 2 FHR tracing  Reevaluate in 1 hours and prn    Marleny Carbone APRN, CNM

## 2020-10-07 NOTE — DISCHARGE SUMMARY
DELIVERY DISCHARGE SUMMARY    Geraldine Neves  : 1990  MRN: [id number]    Admit date: 10/6/2020  Discharge date: 10/9/2020    Admit Dx:   - 30 year old  at 42w1d  - SROM    Discharge Dx:  - Same as above, s/p procedure below  - Prolonged rupture of membranes >36 hours  - Arrest of descent  - Anuria, resolved  - DEEJAY, resolved  - Acute blood loss anemia    Procedures:  - Primary low transverse  section with double layer closure via Pfannenstiel incision, cystoscopy  - Epidural anesthesia  - Bilateral retrograde pyelograms by Urology    Admit HPI:  Ms. Geraldine Neves is a 30 year old  at 42w1d who was admitted to the [facility] service for prelabor rupture of membranes.   Please see her admit H&P for full details of her PMH, PSH, Meds, Allergies and exam on admit.    Consult:  Urology, anesthesia     Hospital course:  Indications:  Geraldine Neves is a 30 year old  at 42w1d admitted to the [facility] service with prelabor rupture of membranes. Labor was induced with pitocin. Patient reached complete dilation and began pushing. However, despite good maternal effort, no descent of fetal head was noted after >2 hours of pushing and 6 hours after being completely dilated. A  section was recommended. The risks, benefits, and alternatives of  section were discussed with the patient including bleeding, infection, injury to surrounding structures (nerves, blood vessels, uterus, cervix, tubes, ovaries, bladder, bowel, rarely baby), and she agreed to proceed. Written consent obtained.    Operative Findings:  1. Minimal amniotic fluid  2. Liveborn male infant in left occiput posterior presentation. Apgars 9 at 1 minute & 9 at 5 minutes. Weight 3340g.  3. Right extension of the hysterotomy into the broad ligament. No cervix or bladder involvement on the right. Left extension of the hysterotomy into the cervix requriing several stitches for hemostasis. Cystoscopy  performed with no noted efflux from bilateral UO. Lasix 5 mg given twice along with fluid bolus and methylene blue with no noted efflux. Bladder otherwise appeared normal with no suture seen within the bladder dome. Bilateral UO visualized to be vermiculating with no efflux. Dr. Solo called intra-op and she concurred that hemostasis had been achieved for the hysterotomy and that no efflux was seen from the UO. Therefore, Urology was called to the OR for an intra-op consult. See their OP note for details. Bilateral distal ureters were determined to be patent by Urology. Otherwise normal uterus, fallopian tubes, and ovaries. Recommend cervical length screening starting at 16 weeks for future pregnancy.    EBL from the delivery was 1910 mL; TXA, methergine, and misoprostol given intra-op. Please see her  Section Operative Note for full details regarding her delivery.    Her postoperative course was uncomplicated. Creatinine and urine output normalized on POD#0. Her platelets dropped to 115 postpartum but recovered to 135 by POD#1. On POD#2, she was meeting all of her postpartum goals and deemed stable for discharge. She was voiding without difficulty, tolerating a regular diet without nausea and vomiting, her pain was well controlled on oral pain medicines and her lochia was appropriate. Her hemoglobin prior to delivery was 12.6 and after delivery was 11.2, trending to 9.1 on POD#1. Her Rh status was positive and Rhogam was not indicated.     Patient was recommended a WBC check on 10/12/2020 given her elevated WBC, though patient was otherwise asymptomatic.       Discharge Medications:     Review of your medicines      START taking      Dose / Directions   senna-docusate 8.6-50 MG tablet  Commonly known as: SENOKOT-S/PERICOLACE      Dose: 1 tablet  Take 1 tablet by mouth 2 times daily  Quantity: 60 tablet  Refills: 0           Where to get your medicines      These medications were sent to [facility]  [place], [place] - [address]  [address] [facility] [address], [facility] [address]    Phone: [phone]     senna-docusate 8.6-50 MG tablet         Discharge/Disposition:  Geraldine Neves was discharged to home in stable condition with the following instructions/medications:  1) Call for temperature > 100.4, bright red vaginal bleeding >1 pad an hour x 2 hours, foul smelling vaginal discharge, pain not controlled by usual oral pain meds, persistent nausea and vomiting not controlled on medications, drainage or redness from incision site  2) She desired to discuss contraception at her 6 week postpartum visit.  3) For feeding she decided to breastfeed.  4) She was instructed to follow-up with her primary OB in 6 weeks for a routine postpartum visit and for staple removal and WBC check in 2 days.  5) Discharge activity:  No heavy lifting >15 lbs or strenuous activity for 6 weeks, pelvic rest for 6 weeks, no driving or operating machinery while on narcotics.      Eneida Donald MD  Ob/Gyn Resident, PGY-3  Pager: [phone]  10/09/20 8:28 AM         Physician Attestation   I, Irma Fletcher, saw and evaluated this patient prior to discharge.  I discussed the patient with the resident/fellow and agree with plan of care as documented in the note.      I personally reviewed vital signs, medications, labs, and imaging.    I personally spent 30 minutes on discharge activities.    Irma Fletcher MD  Date of Service (when I saw the patient): 10/09/20

## 2020-10-07 NOTE — OR NURSING
OR to PACU Transfer Note  Data: Pt to OB PACU at 1625 via cart. PIV infusing LR without complications, del angel with clear green-blue urine to gravity, temp 100.0F, vss, pt does not complain of pain and/or nausea.   Interventions: IV to pump, monitors and alarms on, SCD on.  Response: stable.  Plan: Patient instructed to notify RN for pain or nausea, routine post op cares, initiate breastfeeding/pumping as soon as patient/infant able.

## 2020-10-07 NOTE — PLAN OF CARE
Data: Geraldine Neves transferred to [facility] via wheelchair at 1825. Baby transferred via parent's arms.  Action: Receiving unit notified of transfer: Yes. Patient and family notified of room change. Report given to Karma SPENCE at 1810. Belongings sent to receiving unit. Accompanied by Registered Nurse. Oriented patient to surroundings. Call light within reach. ID bands double-checked with receiving RN.  Response: Patient tolerated transfer and is stable.

## 2020-10-07 NOTE — ANESTHESIA PROCEDURE NOTES
Epidural Procedure Note      Staff -   Anesthesiologist:  Cruz Evans MD  Performed By: Anesthesiologist    Location: OB   Pre-procedure checklist:   patient identified, IV checked, site marked, risks and benefits discussed, informed consent, monitors and equipment checked, pre-op evaluation, at physician/surgeon's request and post-op pain management      Correct Patient: Yes      Correct Position: Yes      Correct Site: Yes      Correct Procedure: Yes      Correct Laterality:  Yes    Site Marked:  Yes  Procedure:     Procedure:  Epidural catheter    ASA:  2    Diagnosis:  Labor pain    Position:  Sitting    Sterile Prep: chloraprep, mask, sterile gloves and patient draped      Insertion site:  L3-4    Local skin infiltration:  2% lidocaine    amount (mL):  2    Approach:  Midline    Needle gauge (G):  17    Needle Length (in):  3.5    Block Needle Type:  [place]    Injection Technique:  LORT saline    SHOBHA at (cm):  6    Attempts:  1    Redirects:  0    Catheter gauge (G):  19    Catheter threaded easily: Yes      Threaded to cm at skin:  10.5    Threaded in epidural space (cm):  4.5    Paresthesias:  No    Aspiration negative for Heme or CSF: Yes      Test dose (mL):  3     Local anesthetic:  Lidocaine 1.5% w/ 1:200,000 epinephrine    Test dose time:  23:13    Test dose negative for signs of intravascular, subdural or intrathecal injection: Yes

## 2020-10-07 NOTE — ANESTHESIA PREPROCEDURE EVALUATION
Anesthesia Pre-Procedure Evaluation    Patient: Geraldine Neves   MRN:     [id number] Gender:   female   Age:    30 year old :      1990        Preoperative Diagnosis: * No surgery found *        LABS:  CBC:   Lab Results   Component Value Date    WBC 18.8 (H) 10/06/2020    HGB 12.6 10/06/2020    HCT 36.7 10/06/2020     10/06/2020     BMP: No results found for: NA, POTASSIUM, CHLORIDE, CO2, BUN, CR, GLC  COAGS: No results found for: PTT, INR, FIBR  POC: No results found for: BGM, HCG, HCGS  OTHER: No results found for: PH, LACT, A1C, NADIA, PHOS, MAG, ALBUMIN, PROTTOTAL, ALT, AST, GGT, ALKPHOS, BILITOTAL, BILIDIRECT, LIPASE, AMYLASE, SAMARA, TSH, T4, T3, CRP, SED     Preop Vitals    BP Readings from Last 3 Encounters:   10/06/20 124/77    Pulse Readings from Last 3 Encounters:   No data found for Pulse      Resp Readings from Last 3 Encounters:   10/06/20 18    SpO2 Readings from Last 3 Encounters:   No data found for SpO2      Temp Readings from Last 1 Encounters:   10/06/20 36.6  C (97.9  F) (Oral)    Ht Readings from Last 1 Encounters:   No data found for Ht      Wt Readings from Last 1 Encounters:   No data found for Wt    There is no height or weight on file to calculate BMI.     LDA:  Peripheral IV 10/06/20 Left Hand (Active)   Site Assessment WDL 10/06/20 1900   Line Status Infusing 10/06/20 1900   Phlebitis Scale 0-->no symptoms 10/06/20 1900   Infiltration Scale 0 10/06/20 1900   Number of days: 0        History reviewed. No pertinent past medical history.   History reviewed. No pertinent surgical history.   No Known Allergies     Anesthesia Evaluation       history and physical reviewed .             ROS/MED HX    ENT/Pulmonary:  - neg pulmonary ROS     Neurologic:  - neg neurologic ROS     Cardiovascular:  - neg cardiovascular ROS       METS/Exercise Tolerance:     Hematologic:         Musculoskeletal:         GI/Hepatic:         Renal/Genitourinary:         Endo:         Psychiatric:          Infectious Disease:         Malignancy:         Other:                     JZG FV AN PHYSICAL EXAM    JZG FV AN PLAN NO PONV RULE    neg OB ROS             Cruz Evans MD

## 2020-10-08 LAB
ANION GAP SERPL CALCULATED.3IONS-SCNC: 2 MMOL/L (ref 3–14)
BUN SERPL-MCNC: 8 MG/DL (ref 7–30)
CALCIUM SERPL-MCNC: 8.3 MG/DL (ref 8.5–10.1)
CHLORIDE SERPL-SCNC: 111 MMOL/L (ref 94–109)
CO2 SERPL-SCNC: 25 MMOL/L (ref 20–32)
CREAT SERPL-MCNC: 0.64 MG/DL (ref 0.52–1.04)
ERYTHROCYTE [DISTWIDTH] IN BLOOD BY AUTOMATED COUNT: 12.3 % (ref 10–15)
GFR SERPL CREATININE-BSD FRML MDRD: >90 ML/MIN/{1.73_M2}
GLUCOSE SERPL-MCNC: 79 MG/DL (ref 70–99)
HCT VFR BLD AUTO: 27.2 % (ref 35–47)
HGB BLD-MCNC: 9.1 G/DL (ref 11.7–15.7)
MCH RBC QN AUTO: 33.3 PG (ref 26.5–33)
MCHC RBC AUTO-ENTMCNC: 33.5 G/DL (ref 31.5–36.5)
MCV RBC AUTO: 100 FL (ref 78–100)
PLATELET # BLD AUTO: 135 10E9/L (ref 150–450)
POTASSIUM SERPL-SCNC: 4.3 MMOL/L (ref 3.4–5.3)
RBC # BLD AUTO: 2.73 10E12/L (ref 3.8–5.2)
SODIUM SERPL-SCNC: 138 MMOL/L (ref 133–144)
WBC # BLD AUTO: 19.3 10E9/L (ref 4–11)

## 2020-10-08 PROCEDURE — G0008 ADMIN INFLUENZA VIRUS VAC: HCPCS | Performed by: STUDENT IN AN ORGANIZED HEALTH CARE EDUCATION/TRAINING PROGRAM

## 2020-10-08 PROCEDURE — 250N000013 HC RX MED GY IP 250 OP 250 PS 637: Performed by: STUDENT IN AN ORGANIZED HEALTH CARE EDUCATION/TRAINING PROGRAM

## 2020-10-08 PROCEDURE — 85027 COMPLETE CBC AUTOMATED: CPT | Performed by: STUDENT IN AN ORGANIZED HEALTH CARE EDUCATION/TRAINING PROGRAM

## 2020-10-08 PROCEDURE — 250N000011 HC RX IP 250 OP 636: Performed by: STUDENT IN AN ORGANIZED HEALTH CARE EDUCATION/TRAINING PROGRAM

## 2020-10-08 PROCEDURE — 36415 COLL VENOUS BLD VENIPUNCTURE: CPT | Performed by: STUDENT IN AN ORGANIZED HEALTH CARE EDUCATION/TRAINING PROGRAM

## 2020-10-08 PROCEDURE — 80048 BASIC METABOLIC PNL TOTAL CA: CPT | Performed by: STUDENT IN AN ORGANIZED HEALTH CARE EDUCATION/TRAINING PROGRAM

## 2020-10-08 PROCEDURE — 120N000002 HC R&B MED SURG/OB UMMC

## 2020-10-08 PROCEDURE — 99232 SBSQ HOSP IP/OBS MODERATE 35: CPT | Performed by: OBSTETRICS & GYNECOLOGY

## 2020-10-08 PROCEDURE — 250N000013 HC RX MED GY IP 250 OP 250 PS 637: Performed by: OBSTETRICS & GYNECOLOGY

## 2020-10-08 PROCEDURE — 90686 IIV4 VACC NO PRSV 0.5 ML IM: CPT | Performed by: STUDENT IN AN ORGANIZED HEALTH CARE EDUCATION/TRAINING PROGRAM

## 2020-10-08 RX ORDER — IBUPROFEN 600 MG/1
600 TABLET, FILM COATED ORAL EVERY 6 HOURS PRN
Status: DISCONTINUED | OUTPATIENT
Start: 2020-10-08 | End: 2020-10-09 | Stop reason: HOSPADM

## 2020-10-08 RX ADMIN — ACETAMINOPHEN 975 MG: 325 TABLET, FILM COATED ORAL at 06:16

## 2020-10-08 RX ADMIN — SIMETHICONE 80 MG: 80 TABLET, CHEWABLE ORAL at 12:15

## 2020-10-08 RX ADMIN — ACETAMINOPHEN 975 MG: 325 TABLET, FILM COATED ORAL at 00:30

## 2020-10-08 RX ADMIN — DOCUSATE SODIUM 50 MG AND SENNOSIDES 8.6 MG 2 TABLET: 8.6; 5 TABLET, FILM COATED ORAL at 07:34

## 2020-10-08 RX ADMIN — IBUPROFEN 600 MG: 600 TABLET, FILM COATED ORAL at 11:09

## 2020-10-08 RX ADMIN — SIMETHICONE 80 MG: 80 TABLET, CHEWABLE ORAL at 07:34

## 2020-10-08 RX ADMIN — INFLUENZA A VIRUS A/GUANGDONG-MAONAN/SWL1536/2019 CNIC-1909 (H1N1) ANTIGEN (FORMALDEHYDE INACTIVATED), INFLUENZA A VIRUS A/HONG KONG/2671/2019 (H3N2) ANTIGEN (FORMALDEHYDE INACTIVATED), INFLUENZA B VIRUS B/PHUKET/3073/2013 ANTIGEN (FORMALDEHYDE INACTIVATED), AND INFLUENZA B VIRUS B/WASHINGTON/02/2019 ANTIGEN (FORMALDEHYDE INACTIVATED) 0.5 ML: 15; 15; 15; 15 INJECTION, SUSPENSION INTRAMUSCULAR at 11:09

## 2020-10-08 RX ADMIN — ACETAMINOPHEN 975 MG: 325 TABLET, FILM COATED ORAL at 18:16

## 2020-10-08 RX ADMIN — ACETAMINOPHEN 975 MG: 325 TABLET, FILM COATED ORAL at 12:15

## 2020-10-08 RX ADMIN — DOCUSATE SODIUM 50 MG AND SENNOSIDES 8.6 MG 2 TABLET: 8.6; 5 TABLET, FILM COATED ORAL at 18:17

## 2020-10-08 RX ADMIN — IBUPROFEN 600 MG: 600 TABLET, FILM COATED ORAL at 23:24

## 2020-10-08 RX ADMIN — IBUPROFEN 600 MG: 600 TABLET, FILM COATED ORAL at 17:22

## 2020-10-08 NOTE — PROGRESS NOTES
CNM Courtesy Visit    CNM courtesy visit provided.  Geraldine has been able to do some reflecting today on her unplanned  section yesterday.  States she had many unanswered questions yesterday about the complications during surgery, but was able to get all those questions answered by the OB team this morning. Offered breastfeeding support and answered questions about using the nipple shield.  Patient is pleased with her care from the OB and CNM team.

## 2020-10-08 NOTE — PLAN OF CARE
Vitals & PP assessments are stable.   Up ad rashida & planning to shower later this evening.   Had good output-voided after del angel removed.   Pt taking PO pain meds with relieve & declined Oxycodone.   Influenza vaccine given this shift.   Pt breast feeding with staff assist & using nipple shield.  Will continue on supportive cares.

## 2020-10-08 NOTE — PROGRESS NOTES
[facility]   Postpartum Note    Name:  Geraldine Neves  MRN: [id number]    S: Patient doing okay, has a lot of questions about her  and the circumstances surrounding it. Pain overall controlled but still having some difficulty with transfers. Tolerating small amounts of PO intake. Has some dizziness with sitting up. Valadez still in place. Reports minimal flatus. Lochia similar to menses.  Breast feeding.     O:   Patient Vitals for the past 24 hrs:   BP Temp Temp src Pulse Resp SpO2   10/08/20 0730 99/57 98.2  F (36.8  C) Oral 87 18 97 %   10/08/20 0322 109/74 98.1  F (36.7  C) Oral 85 16 98 %   10/07/20 2334 104/69 98.2  F (36.8  C) Temporal 84 16 99 %   10/07/20 2234 111/86 98.5  F (36.9  C) Oral 84 18 96 %   10/07/20 2140 106/80 98  F (36.7  C) Oral 84 16 98 %   10/07/20 2040 100/58 98.7  F (37.1  C) Oral 81 16 97 %   10/07/20 1940 128/76 99  F (37.2  C) Oral 74 18 95 %   10/07/20 1910 107/43 -- -- 86 16 96 %   10/07/20 1840 117/77 98.6  F (37  C) Oral 79 18 98 %   10/07/20 1815 108/71 98.9  F (37.2  C) Oral 84 23 94 %   10/07/20 1800 111/70 99.1  F (37.3  C) Oral 87 16 95 %   10/07/20 1745 112/70 -- -- 85 14 96 %   10/07/20 1730 119/76 99.4  F (37.4  C) Oral 84 22 96 %   10/07/20 1715 124/43 -- -- 81 16 98 %   10/07/20 1700 110/72 99.5  F (37.5  C) Oral 88 18 96 %   10/07/20 1645 109/82 -- -- 91 20 94 %   10/07/20 1630 108/77 100  F (37.8  C) Oral 92 18 95 %     Gen:  Resting comfortably, NAD  CV:  Regular rate, well perfused  Pulm:  Breathing room air comfortably  Abd:  Soft, appropriately ttp, moderately distended. Fundus below umbilicus, firm and non-tender. Exam limited by body habitus and distension  Incision: C/d/i, no surrounding redness or erythema with sterile dressing in place  Ext:  Non-tender, 1+ LE edema b/l    I/O last 3 completed shifts:  In: 4100 [P.O.:1100; I.V.:3000]  Out: 9110 [Urine:7200; Blood:1910]    Hgb:   Hemoglobin   Date Value Ref Range Status    10/08/2020 9.1 (L) 11.7 - 15.7 g/dL Final       Assessment/Plan:  30 year old  on POD#1 s/p PLTCS for arrest of descent, complicated by left hysterotomy extension through the cervix and right hysterotomy extension to the broad ligament, s/p cystoscopy and bilateral retrograde pyelograms with Urology. Doing okay postoperatively.    Had long discussion with patient this morning regarding the rationale for recommending a  section, including decreasing urine output with concern for kidney or bladder function as well as lack of descent with pushing for an extended period of time. Discussed hysterotomy extensions with recommendation for serial cervical length measurements by transvaginal ultrasound if she becomes pregnant in the future. Also discussed cystoscopy and retrograde pyelograms showing intact ureters. Discussed DEEJAY likely due to bladder compression by fetal head, close monitoring of kidney and bladder function postoperatively. Patient understanding, no further questions at this time.    Continue with routine postpartum care:  Pain: Well-controlled with Tylenol and oxycodone prn, holding NSAIDs due to DEEJAY  Hgb: 12.6> QBL 1910 (TXA., meth, miso)> 11.2> 9.1. Patient is mildly symptomatic from acute blood loss anemia and vitals are reassuring. Will discharge home with PO iron  Rh: positive  Rubella: immune  Feed: breast    Thrombocytopenia:  - Platelets 168> 115, repeat level pending this morning. Possibly due to postpartum hemorrhage, continue to monitor    DEEJAY:  - Cr 1.27 after delivery, very low UOP prior to delivery but robust output postpartum. Likely due to obstruction from fetal head  - Repeat Cr pending this morning  - Holding NSAIDs as above  - Cystoscopy and bilateral retrograde pyelograms performed at time of  with patent, intact ureters bilaterally    Dispo: DC home likely POD#3 pending postoperative goals    Eneida Donald MD  Ob/Gyn Resident, PGY-3  Pager:  [phone]  10/08/20 8:34 AM     Appreciate note by Dr. Roberto. Patient has been seen and examined by me separate from the resident, agree with above note. Resolution of DEEJAY, resolving thrombocytopenia. Will start ibuprofen this am, iron at discharge. Additional questions answered- overall feeling well and happy with care.   Last Comprehensive Metabolic Panel:  Sodium   Date Value Ref Range Status   10/08/2020 138 133 - 144 mmol/L Final     Potassium   Date Value Ref Range Status   10/08/2020 4.3 3.4 - 5.3 mmol/L Final     Chloride   Date Value Ref Range Status   10/08/2020 111 (H) 94 - 109 mmol/L Final     Carbon Dioxide   Date Value Ref Range Status   10/08/2020 25 20 - 32 mmol/L Final     Anion Gap   Date Value Ref Range Status   10/08/2020 2 (L) 3 - 14 mmol/L Final     Glucose   Date Value Ref Range Status   10/08/2020 79 70 - 99 mg/dL Final     Urea Nitrogen   Date Value Ref Range Status   10/08/2020 8 7 - 30 mg/dL Final     Creatinine   Date Value Ref Range Status   10/08/2020 0.64 0.52 - 1.04 mg/dL Final     GFR Estimate   Date Value Ref Range Status   10/08/2020 >90 >60 mL/min/[1.73_m2] Final     Comment:     Non  GFR Calc  Starting 12/18/2018, serum creatinine based estimated GFR (eGFR) will be   calculated using the Chronic Kidney Disease Epidemiology Collaboration   (CKD-EPI) equation.       Calcium   Date Value Ref Range Status   10/08/2020 8.3 (L) 8.5 - 10.1 mg/dL Final     CBC RESULTS:   Recent Labs   Lab Test 10/08/20  0649   WBC 19.3*   RBC 2.73*   HGB 9.1*   HCT 27.2*      MCH 33.3*   MCHC 33.5   RDW 12.3   *       Isha Mackay MD  10:46 AM

## 2020-10-08 NOTE — PLAN OF CARE
Pt VSS and postpartum assessments WDL. Pt has adequate output, del angel in place. Pt ambulated in the room overnight and is eating/drinking normally. Incision is covered by dressing. Dressing clean, dry and intact. Breastfeeding with moderate assistance, using nipple shield. Taking Tylenol for pain, reports good pain management. Pt is bonding well with infant. Support person is attentive at the bedside. Continue with plan of care.

## 2020-10-08 NOTE — PLAN OF CARE
Patient arrived to [facility] unit via zoom cart at 1840,with belongings, accompanied by spouse/ significant other, with infant in arms. Received report from JORDY Gorman and checked bands. Unit and room orientation started. Call light given; no concerns present at this time. Continue with plan of care.

## 2020-10-08 NOTE — PLAN OF CARE
Pt vital signs and assessment findings normal. Temperature improving. Bleeding scant, fundus firm. Incision dressing clean, dry, and intact. Catheter in place and urine output adequate. Urine clear and green in color. Pain managed with tylenol. Stool softener given. Pt drink and tolerating small snacks, no nausea. Pt has some dependent edema. Ambulated in room with stand by assist at . Pt needing assistance with breastfeeding as first time mom. Larger breasts with flat nipple and baby has difficulty sustaining latch. Pt is supported by spouse at bedside and bonding well with . Continue plan of care.

## 2020-10-08 NOTE — ANESTHESIA PREPROCEDURE EVALUATION
Anesthesia Pre-Procedure Evaluation    Patient: Geraldine Neves   MRN:     [id number] Gender:   female   Age:    30 year old :      1990        Preoperative Diagnosis: * No pre-op diagnosis entered *   Procedure(s):   SECTION  Cystoscopy     LABS:  CBC:   Lab Results   Component Value Date    WBC 19.3 (H) 10/08/2020    WBC 25.5 (H) 10/07/2020    HGB 9.1 (L) 10/08/2020    HGB 11.2 (L) 10/07/2020    HCT 27.2 (L) 10/08/2020    HCT 32.2 (L) 10/07/2020     (L) 10/08/2020     (L) 10/07/2020     BMP:   Lab Results   Component Value Date     10/08/2020    POTASSIUM 4.3 10/08/2020    CHLORIDE 111 (H) 10/08/2020    CO2 25 10/08/2020    BUN 8 10/08/2020    CR 0.64 10/08/2020    CR 1.27 (H) 10/07/2020    GLC 79 10/08/2020     COAGS: No results found for: PTT, INR, FIBR  POC: No results found for: BGM, HCG, HCGS  OTHER:   Lab Results   Component Value Date    NADIA 8.3 (L) 10/08/2020        Preop Vitals    BP Readings from Last 3 Encounters:   10/08/20 99/57    Pulse Readings from Last 3 Encounters:   10/08/20 87      Resp Readings from Last 3 Encounters:   10/08/20 18    SpO2 Readings from Last 3 Encounters:   10/08/20 97%      Temp Readings from Last 1 Encounters:   10/08/20 36.8  C (98.2  F) (Oral)    Ht Readings from Last 1 Encounters:   No data found for Ht      Wt Readings from Last 1 Encounters:   No data found for Wt    There is no height or weight on file to calculate BMI.     LDA:  Peripheral IV 10/06/20 Left Hand (Active)   Site Assessment WDL 10/07/20 1630   Line Status Infusing 10/07/20 1630   Phlebitis Scale 0-->no symptoms 10/07/20 1630   Infiltration Scale 0 10/07/20 1630   Number of days: 2        Past Medical History:   Diagnosis Date     Hx of maternal cervical laceration, currently pregnant     AoDes C/S 10/2020 with uterine extension into the left cervix. Recommend cervical length screening for future pregnancy      Past Surgical History:   Procedure Laterality Date       SECTION N/A 10/7/2020    Procedure:  SECTION;  Surgeon: Lucero Lopez MD;  Location: [facility] L[facility]     CYSTOSCOPY N/A 10/7/2020    Procedure: Cystoscopy;  Surgeon: Lucero Lopez MD;  Location: [facility] L+D      No Known Allergies     Anesthesia Evaluation     .             ROS/MED HX    ENT/Pulmonary:  - neg pulmonary ROS     Neurologic:  - neg neurologic ROS     Cardiovascular:  - neg cardiovascular ROS       METS/Exercise Tolerance:     Hematologic:  - neg hematologic  ROS       Musculoskeletal:  - neg musculoskeletal ROS       GI/Hepatic:         Renal/Genitourinary:  - ROS Renal section negative       Endo:  - neg endo ROS       Psychiatric:  - neg psychiatric ROS       Infectious Disease:  - neg infectious disease ROS       Malignancy:      - no malignancy   Other:    (+) no H/O Chronic Pain,                       PHYSICAL EXAM:   Mental Status/Neuro: A/A/O   Airway: Facies: Feasible  Mallampati: II  Mouth/Opening: Full  TM distance: > 6 cm  Neck ROM: Full   Respiratory: Auscultation: CTAB     Resp. Rate: Normal     Resp. Effort: Normal      CV: Rhythm: Regular  Rate: Age appropriate  Heart: Normal Sounds  Edema: None   Comments:      Dental: Normal Dentition                Assessment:   ASA SCORE: 2    H&P: History and physical reviewed and following examination; no interval change.   Smoking Status:  Non-Smoker/Unknown   NPO Status: NPO Appropriate     Plan:   Anes. Type:  MAC; Peripheral Nerve Block; For Post-op pain in coordination with surgeon; Epidural     Epidural Details:  Catheter; Lumbar     Block Details: TAP; Exparel; Single Shot   Pre-Medication: None   Induction:  N/a   Airway: Native Airway   Access/Monitoring: PIV   Maintenance: N/a     Postop Plan:   Postop Pain: Regional  Postop Sedation/Airway: Not planned  Disposition: Inpatient/Admit     PONV Management:   Adult Risk Factors: Female, Non-Smoker   Prevention: Ondansetron     CONSENT: Direct conversation   Plan and  risks discussed with: Patient   Blood Products: Consented (ALL Blood Products)           Patient taken for urgent section, note completed post-op due urgent section        Blas Baca MD

## 2020-10-09 VITALS
RESPIRATION RATE: 16 BRPM | DIASTOLIC BLOOD PRESSURE: 78 MMHG | TEMPERATURE: 97.7 F | OXYGEN SATURATION: 98 % | HEART RATE: 89 BPM | SYSTOLIC BLOOD PRESSURE: 117 MMHG

## 2020-10-09 LAB
ALBUMIN UR-MCNC: NEGATIVE MG/DL
APPEARANCE UR: CLEAR
BACTERIA #/AREA URNS HPF: ABNORMAL /HPF
BILIRUB UR QL STRIP: NEGATIVE
COLOR UR AUTO: ABNORMAL
GLUCOSE UR STRIP-MCNC: NEGATIVE MG/DL
HGB UR QL STRIP: ABNORMAL
KETONES UR STRIP-MCNC: NEGATIVE MG/DL
LEUKOCYTE ESTERASE UR QL STRIP: NEGATIVE
MUCOUS THREADS #/AREA URNS LPF: PRESENT /LPF
NITRATE UR QL: NEGATIVE
PH UR STRIP: 6 PH (ref 5–7)
RBC #/AREA URNS AUTO: 3 /HPF (ref 0–2)
SOURCE: ABNORMAL
SP GR UR STRIP: 1 (ref 1–1.03)
SQUAMOUS #/AREA URNS AUTO: <1 /HPF (ref 0–1)
UROBILINOGEN UR STRIP-MCNC: NORMAL MG/DL (ref 0–2)
WBC # BLD AUTO: 24.5 10E9/L (ref 4–11)
WBC #/AREA URNS AUTO: 3 /HPF (ref 0–5)

## 2020-10-09 PROCEDURE — 99238 HOSP IP/OBS DSCHRG MGMT 30/<: CPT | Mod: GC | Performed by: OBSTETRICS & GYNECOLOGY

## 2020-10-09 PROCEDURE — 85048 AUTOMATED LEUKOCYTE COUNT: CPT | Performed by: OBSTETRICS & GYNECOLOGY

## 2020-10-09 PROCEDURE — 36415 COLL VENOUS BLD VENIPUNCTURE: CPT | Performed by: OBSTETRICS & GYNECOLOGY

## 2020-10-09 PROCEDURE — 250N000013 HC RX MED GY IP 250 OP 250 PS 637: Performed by: STUDENT IN AN ORGANIZED HEALTH CARE EDUCATION/TRAINING PROGRAM

## 2020-10-09 PROCEDURE — 250N000013 HC RX MED GY IP 250 OP 250 PS 637: Performed by: OBSTETRICS & GYNECOLOGY

## 2020-10-09 PROCEDURE — 81001 URINALYSIS AUTO W/SCOPE: CPT | Performed by: OBSTETRICS & GYNECOLOGY

## 2020-10-09 PROCEDURE — 87086 URINE CULTURE/COLONY COUNT: CPT | Performed by: OBSTETRICS & GYNECOLOGY

## 2020-10-09 RX ORDER — AMOXICILLIN 250 MG
1 CAPSULE ORAL 2 TIMES DAILY
Qty: 60 TABLET | Refills: 0 | Status: SHIPPED | OUTPATIENT
Start: 2020-10-09 | End: 2020-10-09

## 2020-10-09 RX ORDER — AMOXICILLIN 250 MG
1 CAPSULE ORAL 2 TIMES DAILY
Qty: 60 TABLET | Refills: 0 | Status: SHIPPED | OUTPATIENT
Start: 2020-10-09

## 2020-10-09 RX ORDER — OXYCODONE HYDROCHLORIDE 5 MG/1
5 TABLET ORAL EVERY 6 HOURS PRN
Qty: 4 TABLET | Refills: 0 | Status: SHIPPED | OUTPATIENT
Start: 2020-10-09 | End: 2020-10-12

## 2020-10-09 RX ADMIN — ACETAMINOPHEN 975 MG: 325 TABLET, FILM COATED ORAL at 01:05

## 2020-10-09 RX ADMIN — IBUPROFEN 600 MG: 600 TABLET, FILM COATED ORAL at 07:07

## 2020-10-09 RX ADMIN — ACETAMINOPHEN 975 MG: 325 TABLET, FILM COATED ORAL at 13:10

## 2020-10-09 RX ADMIN — DOCUSATE SODIUM 50 MG AND SENNOSIDES 8.6 MG 1 TABLET: 8.6; 5 TABLET, FILM COATED ORAL at 07:07

## 2020-10-09 RX ADMIN — ACETAMINOPHEN 975 MG: 325 TABLET, FILM COATED ORAL at 07:07

## 2020-10-09 RX ADMIN — IBUPROFEN 600 MG: 600 TABLET, FILM COATED ORAL at 13:10

## 2020-10-09 NOTE — PROGRESS NOTES
[facility]   Postpartum Note    Name:  Geraldine Neves  MRN: [id number]    S: Patient doing well, tired this morning due to baby cluster feeding last night. Nonetheless feels ready to go home today. Pain well controlled. Tolerating regular diet. Denies any dizziness with ambulation. Voiding spontaneously. Passing flatus and BMs. Lochia similar to menses.  Breast feeding. Plans to discuss contraception at her 6w postpartum visit.    O:   Patient Vitals for the past 24 hrs:   BP Temp Temp src Pulse Resp SpO2   10/08/20 1820 113/74 98.1  F (36.7  C) Oral 94 16 --   10/08/20 1220 109/72 98.2  F (36.8  C) Oral 89 18 98 %   10/08/20 0730 99/57 98.2  F (36.8  C) Oral 87 18 97 %   10/08/20 0322 109/74 98.1  F (36.7  C) Oral 85 16 98 %   10/07/20 2334 104/69 98.2  F (36.8  C) Temporal 84 16 99 %     Gen:  Resting comfortably, NAD  CV:  Regular rate, well perfused  Pulm:  Breathing room air comfortably  Abd:  Soft, appropriately ttp, mildly distended. Fundus below umbilicus, firm and non-tender. Exam limited by body habitus  Incision: C/d/i, no surrounding redness or erythema with staples in place  Ext:  Non-tender, 1+ LE edema b/l    I/O last 3 completed shifts:  In: 1900 [P.O.:1900]  Out: 8200 [Urine:8200]    Hgb:   Hemoglobin   Date Value Ref Range Status   10/08/2020 9.1 (L) 11.7 - 15.7 g/dL Final     Assessment/Plan:  30 year old  on POD#2 s/p PLTCS for arrest of descent, complicated by left hysterotomy extension through the cervix and right hysterotomy extension to the broad ligament, s/p cystoscopy and bilateral retrograde pyelograms with Urology. Doing okay postoperatively. Recommend for serial cervical length measurements by transvaginal ultrasound if she becomes pregnant in the future.    Continue with routine postpartum care:  Pain: Well-controlled with Tylenol, ibuprofen,  and oxycodone prn  Hgb: 12.6> QBL 1910 (TXA., meth, miso)> 11.2> 9.1. Patient is asymptomatic from  acute blood loss anemia and vitals are reassuring. Will discharge home with PO iron  Rh: positive  Rubella: immune  Feed: breast    Thrombocytopenia:  - Platelets 168> 115> 135. Improving, possibly due to postpartum hemorrhage. Consider repeat at 6w postpartum    DEEJAY:  - Cr 1.27 after delivery, very low UOP prior to delivery but robust output postpartum. Likely due to obstruction from fetal head  - Repeat Cr 0.64, DEEJAY resolved  - Restarted NSAIDs as above  - Cystoscopy and bilateral retrograde pyelograms performed at time of  with patent, intact ureters bilaterally    Dispo: DC home possibly later today vs tomorrow    Eneida Donald MD  Ob/Gyn Resident, PGY-3  Pager: [phone]  10/09/20 6:52 AM

## 2020-10-09 NOTE — PLAN OF CARE
Pt VSS and postpartum assessment WDL. Pt is up ad rashida, voiding without difficulty, passing gas and eating/drinking normally. Incision appears to be healing well, lochia WDL, no s/s of infection. Incision closed with staples, open to air. Pt is independent with self and infant cares. Breastfeeding on cue with minimal assistance, using nipple shield for flat nipples. Taking Tylenol and Ibuprofen for pain, reports good pain management. Pt is bonding well with infant. Support person present at the bedside. Continue with plan of care.

## 2020-10-09 NOTE — PLAN OF CARE
Pt vital signs and assessment findings normal. Pt pain managed with tylenol and ibuprofen. Pt bleeding scant, fundus firm. Incision open to air, no drainage with staples. Pt up ad rashida. Tolerating regular diet. Voiding and has had BM. Showered this evening. Breastfeeding with full assist and nipple shield. Hand expressed with nurse as well as pumped and collected .5ml - 1 ml. Tips used to wake baby taught to family. Some dependent edema noted. Supported by spouse at bedside. Continue to monitor.

## 2020-10-09 NOTE — PROVIDER NOTIFICATION
10/09/20 1329   Provider Notification   Provider Name/Title Dr Fletcher   Method of Notification Electronic Page   Request Evaluate-Remote   Notification Reason Lab Results   WBC resulted 24.5, Do you wanna do anything before Pt goes home, Thanks.

## 2020-10-09 NOTE — PLAN OF CARE
Pt discharged to home with baby. Instructions given & reviewed. ID bands double checked. Pt verbalized understanding her plan & had no further questions. Instructed to follow up at clinic on Monday for staples removal.  Encouraged to follow up at clinic within 6 weeks for PP check.

## 2020-10-10 LAB
BACTERIA SPEC CULT: NORMAL
SPECIMEN SOURCE: NORMAL

## 2020-10-12 ENCOUNTER — OFFICE VISIT (OUTPATIENT)
Dept: OBGYN | Facility: CLINIC | Age: 30
End: 2020-10-12
Attending: OBSTETRICS & GYNECOLOGY
Payer: COMMERCIAL

## 2020-10-12 ENCOUNTER — TELEPHONE (OUTPATIENT)
Dept: OBGYN | Facility: CLINIC | Age: 30
End: 2020-10-12

## 2020-10-12 VITALS — HEART RATE: 84 BPM | WEIGHT: 182.6 LBS | SYSTOLIC BLOOD PRESSURE: 128 MMHG | DIASTOLIC BLOOD PRESSURE: 83 MMHG

## 2020-10-12 DIAGNOSIS — Z98.890 POSTOPERATIVE STATE: Primary | ICD-10-CM

## 2020-10-12 DIAGNOSIS — Z98.891 S/P CESAREAN SECTION: ICD-10-CM

## 2020-10-12 LAB — WBC # BLD AUTO: 14 10E9/L (ref 4–11)

## 2020-10-12 PROCEDURE — 36415 COLL VENOUS BLD VENIPUNCTURE: CPT | Performed by: OBSTETRICS & GYNECOLOGY

## 2020-10-12 PROCEDURE — G0463 HOSPITAL OUTPT CLINIC VISIT: HCPCS

## 2020-10-12 PROCEDURE — 99212 OFFICE O/P EST SF 10 MIN: CPT | Performed by: OBSTETRICS & GYNECOLOGY

## 2020-10-12 PROCEDURE — 85048 AUTOMATED LEUKOCYTE COUNT: CPT | Performed by: OBSTETRICS & GYNECOLOGY

## 2020-10-12 NOTE — TELEPHONE ENCOUNTER
Pt called and added to an appt at 2 pm TODAY with Dr. Lopez.  Reviewed with Geraldine and agrees to plan

## 2020-10-12 NOTE — LETTER
10/12/2020       RE: Geraldine Neves  [id number] [facility] [address]     Dear Colleague,    Thank you for referring your patient, Geraldine Neves, to the [facility]OM[facility] [place] [facility]. Please see a copy of my visit note below.    Subjective    Geraldine is a 30 year old  s/p  section delivery on 10/07/2020.  Patient is here to have staples removed from transverse incision.  She reports feeling well from post op standpoint. Patient does not have pain.     Objective  /83 (BP Location: Left arm, Patient Position: Chair)   Pulse 84   Wt 82.8 kg (182 lb 9.6 oz)   Breastfeeding Yes   Incision : Clean and dry. Staples removed w/o issue.   Ecchymosis superior to incision.     Assessment    Patient is  female s/p  delivery on 10/7/2020. Incision is clean, dry, intact.  Patient has not yet had WBC counts measured since discharge.    Plan     -Get WBC counts checked today  -The patient will be notified of any results via NoteSick.  -Call with any changes or concerns.  F/u for routine PP visit as planned at 6 wks.     Kashif Carrion, MS3    The above patient was seen and evaluated with the medical student who acted as my scribe for the above note. Agree with note, changes made as appropriate.    Lucero Lopez MD, APRN CNM

## 2020-10-12 NOTE — TELEPHONE ENCOUNTER
----- Message from Kristen Noel sent at 10/12/2020  8:15 AM CDT -----  Regarding: Monday Staples Removal  10/12/2020 @ 8:15am  Geraldine was to call this morning, she had surgery on 10/6 and Discharged on Friday, 10/9.  She is supposed to get Staples removed.  Please call Geraldine: [phone].    Thank you!  Kristen GUARDADO    Please DO NOT send this message and/or reply back to sender.  Call Center Representatives DO NOT respond to messages.

## 2020-10-12 NOTE — PROGRESS NOTES
Subjective    Geraldine is a 30 year old  s/p  section delivery on 10/07/2020.  Patient is here to have staples removed from transverse incision.  She reports feeling well from post op standpoint. Patient does not have pain.     Objective  /83 (BP Location: Left arm, Patient Position: Chair)   Pulse 84   Wt 82.8 kg (182 lb 9.6 oz)   Breastfeeding Yes   Incision : Clean and dry. Staples removed w/o issue.   Ecchymosis superior to incision.     Assessment    Patient is  female s/p  delivery on 10/7/2020. Incision is clean, dry, intact.  Patient has not yet had WBC counts measured since discharge.    Plan     -Get WBC counts checked today  -The patient will be notified of any results via Workface.  -Call with any changes or concerns.  F/u for routine PP visit as planned at 6 wks.     Kashif Carrion, MS3    The above patient was seen and evaluated with the medical student who acted as my scribe for the above note. Agree with note, changes made as appropriate.    Lucero Lopez MD, APRN CNM

## 2020-10-12 NOTE — NURSING NOTE
Chief Complaint   Patient presents with     Follow Up     Staple removal and WBC labs       See DOMINGO Fisher 10/12/2020

## 2020-10-15 ENCOUNTER — MYC MEDICAL ADVICE (OUTPATIENT)
Dept: OBGYN | Facility: CLINIC | Age: 30
End: 2020-10-15

## 2020-10-15 ENCOUNTER — TELEPHONE (OUTPATIENT)
Dept: OBGYN | Facility: CLINIC | Age: 30
End: 2020-10-15

## 2020-10-29 ENCOUNTER — TELEPHONE (OUTPATIENT)
Dept: OBGYN | Facility: CLINIC | Age: 30
End: 2020-10-29

## 2020-10-29 NOTE — TELEPHONE ENCOUNTER
----- Message from Marcelina Mcintosh RN sent at 10/28/2020  9:39 AM CDT -----  Regarding: Needs MD statement for disability

## 2020-10-29 NOTE — TELEPHONE ENCOUNTER
Called patient and advised that paperwork was faxed yesterday.  She requests that both documents be emailed to her as well.  Done per her request

## 2020-10-30 ENCOUNTER — TELEPHONE (OUTPATIENT)
Dept: OBGYN | Facility: CLINIC | Age: 30
End: 2020-10-30

## 2020-10-30 NOTE — TELEPHONE ENCOUNTER
Pt left message forms faxed to life insurance again as states not received and email a copy o Geraldine.  Both done .    LM on Geraldine's voice mail of above.  Call if not received.  Please look into [employer] to see if might be there.      I also called [employer].  They suggested I also email to [email or web address] as they are working remotely  (done).

## 2021-01-04 ENCOUNTER — HEALTH MAINTENANCE LETTER (OUTPATIENT)
Age: 31
End: 2021-01-04

## 2021-01-26 NOTE — PROGRESS NOTES
[facility] Labor and Delivery Progress Note    Geraldine Neves MRN# [id number]   Age: 30 year old YOB: 1990           Subjective:   Called to bedside to assess patient by RN. Patient states she is feeling constant pressure and feels like she needs to push.           Objective:     Patient Vitals for the past 24 hrs:   BP Temp Temp src Resp SpO2 Oximeter Heart Rate   10/07/20 0630 118/69 98  F (36.7  C) Oral 18 97 % 91 bpm   10/07/20 0530 124/69 98.6  F (37  C) Oral 18 97 % 88 bpm   10/07/20 0330 119/69 98  F (36.7  C) Oral 18 96 % 83 bpm   10/07/20 0230 121/73 98.5  F (36.9  C) Oral 18 95 % 91 bpm   10/07/20 0200 115/67 98  F (36.7  C) Oral 18 97 % 90 bpm   10/07/20 0130 113/66 -- -- 18 96 % 77 bpm   10/07/20 0100 110/66 98  F (36.7  C) -- 18 97 % 74 bpm   10/07/20 0030 112/64 98.2  F (36.8  C) Oral 18 90 % 86 bpm   10/06/20 2357 105/67 -- -- 18 -- 79 bpm   10/06/20 2348 111/58 -- -- 18 95 % 92 bpm   10/06/20 2345 113/58 -- -- 18 95 % 77 bpm   10/06/20 2338 109/58 -- -- 18 97 % 89 bpm   10/06/20 2330 106/58 98.7  F (37.1  C) Oral 18 98 % 93 bpm   10/06/20 2326 108/59 -- -- 18 97 % 88 bpm   10/06/20 2324 119/65 -- -- 18 97 % 89 bpm   10/06/20 2322 124/74 -- -- -- -- --   10/06/20 2320 116/72 -- -- -- -- --   10/06/20 2318 134/69 -- -- -- -- --   10/06/20 2203 -- 97.9  F (36.6  C) Oral 18 -- --   10/06/20 2035 124/77 98.4  F (36.9  C) Oral 20 -- 90 bpm   10/06/20 1945 -- 98.3  F (36.8  C) Oral 19 -- --   10/06/20 1815 -- 98.4  F (36.9  C) Oral 16 -- --   10/06/20 1626 123/70 98.4  F (36.9  C) Oral 18 -- --   10/06/20 1434 -- 98.3  F (36.8  C) -- -- -- --   10/06/20 1254 -- 97.5  F (36.4  C) Oral -- -- --   10/06/20 1046 112/70 98.4  F (36.9  C) Oral -- -- 72 bpm   10/06/20 0950 -- 98.4  F (36.9  C) Oral -- -- --   10/06/20 0912 -- 98.4  F (36.9  C) Oral -- -- --   10/06/20 0827 -- 97.9  F (36.6  C) Oral -- -- --   10/06/20 0727 -- 98  F (36.7  C) Oral 16 -- --   10/06/20 0700 120/75 98.2  F  (36.8  C) Oral 18 -- 80 bpm         Cervical Exam: 10 / 100% / 2      Position: Anterior    Membranes: SROM and leaking thick meconium    Fetal Heart Rate:    Monitor: external US    Variability: moderate (amplitude range 6 to 25 bpm)    Baseline Rate: 135    Fetal Heart Rate Tracing: category 2 with decelerations to the 90's corrected with repositioning.            Assessment:   Geraldine Neves is a 30 year old  who is 42w1d here with IOL for thick meconium and post dates with Pitocin. Epidural in place with good pain coverage. +3 non-pitting edema noted in LE. Unable to void. Pt noted to be complete with good pushing effort.          Plan:   Recommended SVE to assess progress. Pt agreeable to this plan. Moderate amount of caput noted on exam.   Continue to reposition for comfort and FHR tracing management.  Labor induction with Pitocin  Pain medication: epidural in place with good pain management  Prophylactic antibiotic for + GBS status  Graduated compression sleeves applied to b/l LE  May begin pushing with nurses when appropriate.    IMadie SNM am serving as a scribe; to document services personally performed by  Lana Cornejo CNM based on data collection and the provider's statements to me.     JAMES Parsons       no

## 2021-10-11 ENCOUNTER — HEALTH MAINTENANCE LETTER (OUTPATIENT)
Age: 31
End: 2021-10-11

## 2022-01-30 ENCOUNTER — HEALTH MAINTENANCE LETTER (OUTPATIENT)
Age: 32
End: 2022-01-30

## 2022-09-24 ENCOUNTER — HEALTH MAINTENANCE LETTER (OUTPATIENT)
Age: 32
End: 2022-09-24

## 2023-05-08 ENCOUNTER — HEALTH MAINTENANCE LETTER (OUTPATIENT)
Age: 33
End: 2023-05-08

## 2023-06-14 ENCOUNTER — TRANSFERRED RECORDS (OUTPATIENT)
Dept: HEALTH INFORMATION MANAGEMENT | Facility: CLINIC | Age: 33
End: 2023-06-14

## 2023-06-19 ENCOUNTER — LAB REQUISITION (OUTPATIENT)
Dept: LAB | Facility: CLINIC | Age: 33
End: 2023-06-19

## 2023-06-19 DIAGNOSIS — Z36.89 ENCOUNTER FOR OTHER SPECIFIED ANTENATAL SCREENING: ICD-10-CM

## 2023-06-19 DIAGNOSIS — E55.9 VITAMIN D DEFICIENCY, UNSPECIFIED: ICD-10-CM

## 2023-06-19 LAB
BASOPHILS # BLD AUTO: 0.1 10E3/UL (ref 0–0.2)
BASOPHILS NFR BLD AUTO: 0 %
EOSINOPHIL # BLD AUTO: 0.2 10E3/UL (ref 0–0.7)
EOSINOPHIL NFR BLD AUTO: 1 %
ERYTHROCYTE [DISTWIDTH] IN BLOOD BY AUTOMATED COUNT: 13.2 % (ref 10–15)
HCT VFR BLD AUTO: 40 % (ref 35–47)
HGB BLD-MCNC: 13.2 G/DL (ref 11.7–15.7)
HOLD SPECIMEN: NORMAL
IMM GRANULOCYTES # BLD: 0 10E3/UL
IMM GRANULOCYTES NFR BLD: 0 %
LYMPHOCYTES # BLD AUTO: 3.3 10E3/UL (ref 0.8–5.3)
LYMPHOCYTES NFR BLD AUTO: 26 %
MCH RBC QN AUTO: 32 PG (ref 26.5–33)
MCHC RBC AUTO-ENTMCNC: 33 G/DL (ref 31.5–36.5)
MCV RBC AUTO: 97 FL (ref 78–100)
MONOCYTES # BLD AUTO: 0.8 10E3/UL (ref 0–1.3)
MONOCYTES NFR BLD AUTO: 6 %
NEUTROPHILS # BLD AUTO: 8.2 10E3/UL (ref 1.6–8.3)
NEUTROPHILS NFR BLD AUTO: 67 %
NRBC # BLD AUTO: 0 10E3/UL
NRBC BLD AUTO-RTO: 0 /100
PLATELET # BLD AUTO: 252 10E3/UL (ref 150–450)
RBC # BLD AUTO: 4.13 10E6/UL (ref 3.8–5.2)
WBC # BLD AUTO: 12.5 10E3/UL (ref 4–11)

## 2023-06-19 PROCEDURE — 82306 VITAMIN D 25 HYDROXY: CPT | Performed by: NURSE PRACTITIONER

## 2023-06-19 PROCEDURE — 86901 BLOOD TYPING SEROLOGIC RH(D): CPT | Performed by: NURSE PRACTITIONER

## 2023-06-19 PROCEDURE — 87340 HEPATITIS B SURFACE AG IA: CPT | Performed by: NURSE PRACTITIONER

## 2023-06-19 PROCEDURE — 86850 RBC ANTIBODY SCREEN: CPT | Performed by: NURSE PRACTITIONER

## 2023-06-19 PROCEDURE — 87086 URINE CULTURE/COLONY COUNT: CPT | Performed by: NURSE PRACTITIONER

## 2023-06-19 PROCEDURE — 82728 ASSAY OF FERRITIN: CPT | Performed by: NURSE PRACTITIONER

## 2023-06-19 PROCEDURE — 86803 HEPATITIS C AB TEST: CPT | Performed by: NURSE PRACTITIONER

## 2023-06-19 PROCEDURE — 85025 COMPLETE CBC W/AUTO DIFF WBC: CPT | Performed by: NURSE PRACTITIONER

## 2023-06-19 PROCEDURE — 87389 HIV-1 AG W/HIV-1&-2 AB AG IA: CPT | Performed by: NURSE PRACTITIONER

## 2023-06-19 PROCEDURE — 86592 SYPHILIS TEST NON-TREP QUAL: CPT | Performed by: NURSE PRACTITIONER

## 2023-06-19 PROCEDURE — 86762 RUBELLA ANTIBODY: CPT | Performed by: NURSE PRACTITIONER

## 2023-06-20 LAB
ABO/RH(D): NORMAL
ANTIBODY SCREEN: NEGATIVE
DEPRECATED CALCIDIOL+CALCIFEROL SERPL-MC: 76 UG/L (ref 20–75)
FERRITIN SERPL-MCNC: 138 NG/ML (ref 6–175)
HBV SURFACE AG SERPL QL IA: NONREACTIVE
HCV AB SERPL QL IA: NONREACTIVE
HIV 1+2 AB+HIV1 P24 AG SERPL QL IA: NONREACTIVE
RPR SER QL: NONREACTIVE
RUBV IGG SERPL QL IA: 6.69 INDEX
RUBV IGG SERPL QL IA: POSITIVE
SPECIMEN EXPIRATION DATE: NORMAL

## 2023-06-21 LAB — BACTERIA UR CULT: NO GROWTH

## 2023-08-14 ENCOUNTER — TRANSCRIBE ORDERS (OUTPATIENT)
Dept: MATERNAL FETAL MEDICINE | Facility: CLINIC | Age: 33
End: 2023-08-14
Payer: COMMERCIAL

## 2023-08-14 ENCOUNTER — MEDICAL CORRESPONDENCE (OUTPATIENT)
Dept: HEALTH INFORMATION MANAGEMENT | Facility: CLINIC | Age: 33
End: 2023-08-14
Payer: COMMERCIAL

## 2023-08-14 ENCOUNTER — TRANSFERRED RECORDS (OUTPATIENT)
Dept: HEALTH INFORMATION MANAGEMENT | Facility: CLINIC | Age: 33
End: 2023-08-14
Payer: COMMERCIAL

## 2023-08-14 DIAGNOSIS — O26.90 PREGNANCY RELATED CONDITION, ANTEPARTUM: Primary | ICD-10-CM

## 2023-08-15 ENCOUNTER — PRE VISIT (OUTPATIENT)
Dept: MATERNAL FETAL MEDICINE | Facility: CLINIC | Age: 33
End: 2023-08-15
Payer: COMMERCIAL

## 2023-08-16 ENCOUNTER — HOSPITAL ENCOUNTER (OUTPATIENT)
Dept: ULTRASOUND IMAGING | Facility: CLINIC | Age: 33
Discharge: HOME | End: 2023-08-16
Attending: NURSE PRACTITIONER
Payer: COMMERCIAL

## 2023-08-16 ENCOUNTER — OFFICE VISIT (OUTPATIENT)
Dept: MATERNAL FETAL MEDICINE | Facility: CLINIC | Age: 33
End: 2023-08-16
Attending: NURSE PRACTITIONER
Payer: COMMERCIAL

## 2023-08-16 DIAGNOSIS — O26.879 SHORT CERVIX AFFECTING PREGNANCY: Primary | ICD-10-CM

## 2023-08-16 DIAGNOSIS — O26.90 PREGNANCY RELATED CONDITION, ANTEPARTUM: ICD-10-CM

## 2023-08-16 PROCEDURE — 76817 TRANSVAGINAL US OBSTETRIC: CPT | Mod: 26 | Performed by: OBSTETRICS & GYNECOLOGY

## 2023-08-16 PROCEDURE — 76805 OB US >/= 14 WKS SNGL FETUS: CPT

## 2023-08-16 PROCEDURE — 76805 OB US >/= 14 WKS SNGL FETUS: CPT | Mod: 26 | Performed by: OBSTETRICS & GYNECOLOGY

## 2023-08-16 NOTE — PROGRESS NOTES
"Please see \"Imaging\" tab under \"Chart Review\" for details of today's US at the [facility].    Ochoa Wynn MD  Maternal-Fetal Medicine    "

## 2023-09-06 ENCOUNTER — HOSPITAL ENCOUNTER (OUTPATIENT)
Dept: ULTRASOUND IMAGING | Facility: CLINIC | Age: 33
Discharge: HOME | End: 2023-09-06
Attending: OBSTETRICS & GYNECOLOGY
Payer: COMMERCIAL

## 2023-09-06 ENCOUNTER — OFFICE VISIT (OUTPATIENT)
Dept: MATERNAL FETAL MEDICINE | Facility: CLINIC | Age: 33
End: 2023-09-06
Attending: OBSTETRICS & GYNECOLOGY
Payer: COMMERCIAL

## 2023-09-06 DIAGNOSIS — O26.90 PREGNANCY RELATED CONDITION, ANTEPARTUM: ICD-10-CM

## 2023-09-06 DIAGNOSIS — O09.299 HISTORY OF MATERNAL CERVICAL LACERATION, CURRENTLY PREGNANT: Primary | ICD-10-CM

## 2023-09-06 PROCEDURE — 76811 OB US DETAILED SNGL FETUS: CPT

## 2023-09-06 PROCEDURE — 76811 OB US DETAILED SNGL FETUS: CPT | Mod: 26 | Performed by: OBSTETRICS & GYNECOLOGY

## 2023-09-06 PROCEDURE — 76817 TRANSVAGINAL US OBSTETRIC: CPT | Mod: 26 | Performed by: OBSTETRICS & GYNECOLOGY

## 2023-09-06 PROCEDURE — 99214 OFFICE O/P EST MOD 30 MIN: CPT | Mod: 25 | Performed by: OBSTETRICS & GYNECOLOGY

## 2023-09-06 NOTE — PROGRESS NOTES
"Please see \"Imaging\" tab under \"Chart Review\" for details of today's visit.    Fran Wright    "

## 2024-07-14 ENCOUNTER — HEALTH MAINTENANCE LETTER (OUTPATIENT)
Age: 34
End: 2024-07-14

## 2025-02-10 ENCOUNTER — ANCILLARY PROCEDURE (OUTPATIENT)
Dept: RADIOLOGY | Facility: CLINIC | Age: 35
End: 2025-02-10
Payer: COMMERCIAL

## 2025-02-14 ENCOUNTER — TRANSCRIBE ORDERS (OUTPATIENT)
Dept: OTHER | Age: 35
End: 2025-02-14

## 2025-02-14 DIAGNOSIS — C50.912 MALIGNANT NEOPLASM OF UNSPECIFIED SITE OF LEFT FEMALE BREAST (H): Primary | ICD-10-CM

## 2025-02-17 ENCOUNTER — TRANSCRIBE ORDERS (OUTPATIENT)
Dept: ONCOLOGY | Facility: CLINIC | Age: 35
End: 2025-02-17
Payer: COMMERCIAL

## 2025-02-17 ENCOUNTER — PATIENT OUTREACH (OUTPATIENT)
Dept: ONCOLOGY | Facility: CLINIC | Age: 35
End: 2025-02-17
Payer: COMMERCIAL

## 2025-02-17 DIAGNOSIS — C50.912 MALIGNANT NEOPLASM OF UNSPECIFIED SITE OF LEFT FEMALE BREAST (H): Primary | ICD-10-CM

## 2025-02-17 NOTE — TELEPHONE ENCOUNTER
RECORDS STATUS - BREAST    RECORDS REQUESTED FROM: [facility], [facility]   NOTES DETAILS STATUS   OFFICE NOTE from referring provider [facility] Dr. Ayla Prince   OFFICE NOTE from medical oncologist     OFFICE NOTE from surgeon     OFFICE NOTE from radiation oncologist     DISCHARGE SUMMARY from hospital     DISCHARGE REPORT from the ER     OPERATIVE REPORT     MEDICATION LIST [facility]    LABS     PATHOLOGY REPORTS  (Tissue diagnosis, Stage, ER/VA percentage positive and intensity of staining, HER2 IHC, FISH, and all biopsies from breast and any distant metastasis)                 Req from [place] 2/10/2025 - [id number]    PATHOLOGY [facility] TRACKING:   TRACKING #: [id number]   IMAGING (NEED IMAGES & REPORT)     CT SCANS     MRI     MAMMO PACS 2/4/2025   ULTRASOUND PACS US Breast: 2/10/2025, 2/4/2025   PET     BONE SCAN     BRAIN MRI     IMAGE DISC FEDEX TRACKING    TRACKING # :

## 2025-02-17 NOTE — PROGRESS NOTES
New Patient Oncology Nurse Navigator Note     Referring provider: Dixie Granados APRN CNP      Referring Clinic/Organization: [facility]     Referred to (specialty:) Medical Oncology and Cancer Surgery      Date Referral Received: February 17, 2025     Evaluation for:  C50.912 (ICD-10-CM) - Malignant neoplasm of unspecified site of left female breast (H)      Clinical History (per Nurse review of records provided):      Geraldine Neves is a 34 year old female who presented with a palpable lump in the 12 o'clock position of he left breast. Recent completion of breast-feeding approximately 2 months ago. Diagnostic imaging completed on 2/4/25.  Left breast: There are 2 hypoechoic irregularly marginated solid lesions in the 12 o'clock position of the left breast approximately 11 cm from the nipple. The first lesion measures 1.6 x 1.2 x 0.9 cm, demonstrating internal blood flow. The second lesion is located approximately 1.5 cm from the first lesion and is taller than wide, with irregular spiculated margins and internal calcifications, measuring 1.6 x 1.6 x 1.2 cm.   Left axilla: A lymph node is noted within the left axilla demonstrating borderline cortical thickening with a cortical thickness of approximately 3 mm. This lymph node is indeterminate.     2/10/25 Case: [id number]                                   A.  Left breast, mass at 12:00 (11 cm from nipple), biopsy:  Histologic type: Invasive ductal carcinoma (invasive carcinoma of no special type)  Histologic grade (Jennifer Histologic Score)       Glandular (acinar)/tubular differentiation: Score 3        Nuclear pleomorphism: Score 3        Mitotic rate: Score 2        Overall ndgndrndanddndend:nd nd2nd Largest Invasive Focus in this Limited Biopsy Sample: 0.9 cm  Lymphatic and/or Vascular Invasion: Not identified  Ductal Carcinoma in Situ: Present       Architectural pattern(s):  Solid       Nuclear grade:  III (high)       Necrosis:  Not  identified  Estrogen receptor: Positive (51-60%, moderate)  Progesterone receptor: Positive (1-10%, weak)  HER2: Positive (score 3+ by IHC)   B.  Left breast, mass at 12:00 (12 cm from nipple), biopsy:  Histologic type: Invasive ductal carcinoma (invasive carcinoma of no special type)  Histologic grade (Jennifer Histologic Score)       Glandular (acinar)/tubular differentiation: Score 3        Nuclear pleomorphism: Score 2        Mitotic rate: Score 3        Overall ndgndrndanddndend:nd nd2nd Largest Invasive Focus in this Limited Biopsy Sample: 1.0 cm  Lymphatic and/or Vascular Invasion: Not identified  Ductal Carcinoma in Situ: Not identified  Estrogen receptor: Low positive (1-10%), see comment  Progesterone receptor: Negative (0%, internal control absent, see comment)  HER2: Positive (score 3+ by IHC)     Patient resides in [place], [place].     2/17 1233 - Telephoned and spoke with Geraldine. Explained my role and purpose for the call. She has been doing research about HER2 positive breast cancer (MD Oswaldo study/article) and has a friend in nuclear medicine who is advising her as well.      2/17 1529 - Telephoned and spoke with Geraldine to offer consult with Dr. Simpson on 2/20 and options for medical oncology consult. We proceeded to schedule surgery consult and she will check with her spouse about the timing of medical oncology consult and call back. She called back and proceeded with Dr. Linda on 2/24.    2/18 - Breast surgery consult through [facility]   2/20 - Medical oncology consult at 3:00pm (she is willing to shift/cancel that if [facility] provider available     Records Location: Care Everywhere, Media, and See Bookmarked material     Records Needed:  Path reports-biopsy  (per protocol)  Pathology reviews (per protocol)  Breast imaging for past 5 years  Systemic imaging (per protocol)

## 2025-02-20 ENCOUNTER — PRE VISIT (OUTPATIENT)
Dept: ONCOLOGY | Facility: CLINIC | Age: 35
End: 2025-02-20
Payer: COMMERCIAL

## 2025-02-20 ENCOUNTER — VIRTUAL VISIT (OUTPATIENT)
Dept: ONCOLOGY | Facility: CLINIC | Age: 35
End: 2025-02-20
Attending: SURGERY
Payer: COMMERCIAL

## 2025-02-20 VITALS — HEIGHT: 61 IN | BODY MASS INDEX: 29.83 KG/M2 | WEIGHT: 158 LBS

## 2025-02-20 DIAGNOSIS — C50.012 MALIGNANT NEOPLASM OF NIPPLE OF LEFT BREAST IN FEMALE, ESTROGEN RECEPTOR NEGATIVE (H): Primary | ICD-10-CM

## 2025-02-20 DIAGNOSIS — Z17.1 MALIGNANT NEOPLASM OF NIPPLE OF LEFT BREAST IN FEMALE, ESTROGEN RECEPTOR NEGATIVE (H): Primary | ICD-10-CM

## 2025-02-20 RX ORDER — VITAMIN A ACETATE, BETA CAROTENE, ASCORBIC ACID, CHOLECALCIFEROL, .ALPHA.-TOCOPHEROL ACETATE, DL-, THIAMINE MONONITRATE, RIBOFLAVIN, NIACINAMIDE, PYRIDOXINE HYDROCHLORIDE, FOLIC ACID, CYANOCOBALAMIN, CALCIUM CARBONATE, FERROUS FUMARATE, ZINC OXIDE, CUPRIC OXIDE 3080; 12; 120; 400; 1; 1.84; 3; 20; 22; 920; 25; 200; 27; 10; 2 [IU]/1; UG/1; MG/1; [IU]/1; MG/1; MG/1; MG/1; MG/1; MG/1; [IU]/1; MG/1; MG/1; MG/1; MG/1; MG/1
1 TABLET, FILM COATED ORAL DAILY
COMMUNITY

## 2025-02-20 RX ORDER — CHLORAL HYDRATE 500 MG
2 CAPSULE ORAL
COMMUNITY

## 2025-02-20 ASSESSMENT — PAIN SCALES - GENERAL: PAINLEVEL_OUTOF10: NO PAIN (0)

## 2025-02-20 NOTE — NURSING NOTE
Current patient location: [address] [place] [id number]    Is the patient currently in the state of [place]? YES    Visit mode: VIDEO    If the visit is dropped, the patient can be reconnected by:VIDEO VISIT: Text to cell phone:   Telephone Information:   Mobile [phone]    and VIDEO VISIT: Send to e-mail at: [email or web address]    Will anyone else be joining the visit? NO  (If patient encounters technical issues they should call [phone])    Are changes needed to the allergy or medication list? No    Are refills needed on medications prescribed by this physician? NO    Rooming Documentation:  Not applicable    Reason for visit: Consult    Eneida GARCIA

## 2025-02-20 NOTE — LETTER
2/20/2025      Geraldine Neves  [address]  [facility] [address]      Dear Colleague,    Thank you for referring your patient, Geraldine Neves, to the [facility] BREAST [facility]. Please see a copy of my visit note below.    Virtual Visit Details    Geraldine is a 34-year-old woman with a new diagnosis of a left breast cancer.  She was approximately 14 months after having her baby had just finished pumping and noticed a lump in her left breast.  She underwent a mammogram and ultrasound which demonstrated 2 masses at the 12 o'clock position.  Both masses measured about 1.6 cm in size.  She underwent a needle biopsy of both areas.  The first biopsy at the 12 o'clock position 11 cm from the nipple demonstrated invasive ductal cancer, grade 3 and was triple positive.  The second biopsy at the 12 o'clock position 12 cm from the nipple was also invasive ductal cancer grade 3 was ER low positive SD negative and HER2 positive.  She had an ultrasound of her axilla which demonstrated borderline mild thickening and decision was made not to biopsy at this.  She has otherwise had no other symptoms.  She has no prior history of any breast problems.  She is a healthy woman with no major medical problems.  Her family history is negative for breast or ovarian cancer.    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  I did recommend a sentinel lymph node biopsy.  I have told her that her medical oncologist would recommend chemotherapy with HER2/camryn based treatment.  I would agree with that recommendation.  Have also recommended genetic testing.  Geraldine is trying to avoid chemotherapy that will not be beneficial to her.  She has a medical oncology appointment today at [facility] and a medical oncology appointment Monday at the [facility].  I  will talk to her more after those consultations.    The video started at 7:50 AM and ended 8:21 AM and the total time was 45 minutes which included reviewing her imaging and the video consult.      Again, thank you for allowing me to participate in the care of your patient.        Sincerely,        Fuad Simpson MD    Electronically signed

## 2025-02-20 NOTE — TELEPHONE ENCOUNTER
Action February 20, 2025 11:35 AM    Action Taken Slides from [place] received and taken to 5th floor path lab for review.

## 2025-02-24 ENCOUNTER — LAB REQUISITION (OUTPATIENT)
Dept: LAB | Facility: CLINIC | Age: 35
End: 2025-02-24
Payer: COMMERCIAL

## 2025-02-24 ENCOUNTER — ONCOLOGY VISIT (OUTPATIENT)
Dept: ONCOLOGY | Facility: CLINIC | Age: 35
End: 2025-02-24
Attending: NURSE PRACTITIONER
Payer: COMMERCIAL

## 2025-02-24 VITALS
OXYGEN SATURATION: 97 % | RESPIRATION RATE: 18 BRPM | TEMPERATURE: 99.4 F | DIASTOLIC BLOOD PRESSURE: 76 MMHG | HEART RATE: 83 BPM | BODY MASS INDEX: 28.52 KG/M2 | WEIGHT: 155 LBS | SYSTOLIC BLOOD PRESSURE: 111 MMHG | HEIGHT: 62 IN

## 2025-02-24 DIAGNOSIS — Z17.0 MALIGNANT NEOPLASM OF LEFT BREAST IN FEMALE, ESTROGEN RECEPTOR POSITIVE, UNSPECIFIED SITE OF BREAST (H): Primary | ICD-10-CM

## 2025-02-24 DIAGNOSIS — C50.912 MALIGNANT NEOPLASM OF LEFT BREAST IN FEMALE, ESTROGEN RECEPTOR POSITIVE, UNSPECIFIED SITE OF BREAST (H): Primary | ICD-10-CM

## 2025-02-24 PROBLEM — C50.012: Status: ACTIVE | Noted: 2025-02-24

## 2025-02-24 PROBLEM — Z17.1: Status: ACTIVE | Noted: 2025-02-24

## 2025-02-24 PROCEDURE — 99213 OFFICE O/P EST LOW 20 MIN: CPT | Performed by: INTERNAL MEDICINE

## 2025-02-24 PROCEDURE — 88321 CONSLTJ&REPRT SLD PREP ELSWR: CPT | Mod: GC | Performed by: STUDENT IN AN ORGANIZED HEALTH CARE EDUCATION/TRAINING PROGRAM

## 2025-02-24 ASSESSMENT — PAIN SCALES - GENERAL: PAINLEVEL_OUTOF10: NO PAIN (0)

## 2025-02-24 NOTE — LETTER
2025      Geraldine Neves  [address]  [facility] [address]      Dear Colleague,    Thank you for referring your patient, Geraldine Neves, to the [facility]. Please see a copy of my visit note below.    Oncology consultation    Name: Geraldine Neves  : 1990  MRN: [id number]    Requesting physician:ALEX Lim CNP  [address] [place] [address]    Reason for consultation: Breast Cancer    HPI: Geraldine Neves is a 34 year old female whom I was asked to see by Dixie JOHNSON CNP for a recently diagnosed left clinical T1c(m) vs T2N0 hormone receptor positive, HER2 positive breast cancer.    She presented with a left breast mass after completing lactation (she is approximately 14 months postpartum) which was confirmed on diagnostic imaging to be 2 adjuvant 1.6cm masses. Biopsy revealed invasive ductal carcinoma, hormone receptor positive, HER2 positive and the other mass  invasive ductal carcinoma, estrogen receptor positive,progesterone receptor negative, HER2 positive. A borderline lymph node was evaluated and radiology did not feel it warranted a biopsy. She was seen by Dr. Simpson last week who recommended neoadjuvant therapy. She was seen by Dr. German at [facility] on 25.      She did have recurrent mastitis with first pregnancy so did a slow pumping wean with second child. No plans for additional pregnancies. She has met with a naturopath. She is a dietician.      SH:     Age at first delivery: age 30  Menarche: 13  Menses: regular, LMP now      FH: no breast or ovarian cancer, + prostate cancer (MGF).      Review of systems: All other systems reviewed and are negative except for what is described in the history of present illness.      PMH:   Patient Active Problem List    Diagnosis Date Noted     Malignant neoplasm of nipple of left breast in female, estrogen receptor negative (H) 2025      Priority: Medium     Hx of maternal cervical laceration, currently pregnant      Priority: Medium     AoDesc C/S 10/2020 with uterine extension into the left cervix. Recommend cervical length screening for future pregnancy       S/P  section 10/07/2020     Priority: Medium     Meconium in amniotic fluid 10/06/2020     Priority: Medium     Post-term pregnancy, 40-42 weeks of gestation 10/06/2020     Priority: Medium     Pregnancy with prenatal care elsewhere in third trimester 10/06/2020     Priority: Medium     Patient with planned Out of Hospital Birth is transferring by car to the hospital for thick Meconium Stained fluid at 42 weeks.  Had Reactive NST 10/5 and then Took Castor oil x1. Had initially clear fluid that was then noted thick meconium.    Patient has been seen by [facility] for prenatal care.  Transferring midwife name(s),/birth center & phone number: [facility]  [phone]  Midwife here supporting patient: No  Records received, reviewed and scanned into chart.          Positive GBS test 10/06/2020     Priority: Medium     Past Medical History:   Diagnosis Date     Hx of maternal cervical laceration, currently pregnant     AoDesc C/S 10/2020 with uterine extension into the left cervix. Recommend cervical length screening for future pregnancy       Medications:   Current Outpatient Medications:      fish oil-omega-3 fatty acids 1000 MG capsule, Take 2 g by mouth., Disp: , Rfl:      Magnesium Citrate (CITROMA PO), , Disp: , Rfl:      Prenatal Vit-Fe Fumarate-FA (PRENATAL PLUS) 27-1 MG TABS, Take 1 tablet by mouth daily., Disp: , Rfl:      senna-docusate (SENOKOT-S/PERICOLACE) 8.6-50 MG tablet, Take 1 tablet by mouth 2 times daily, Disp: 60 tablet, Rfl: 0    Allergies: No Known Allergies    Social history:   Social History     Socioeconomic History     Marital status:      Spouse name: Not on file     Number of children: Not on file     Years of education: Not on file     Highest  "education level: Not on file   Occupational History     Not on file   Tobacco Use     Smoking status: Never     Smokeless tobacco: Never   Substance and Sexual Activity     Alcohol use: Not Currently     Drug use: Not Currently     Sexual activity: Yes     Partners: Male   Other Topics Concern     Not on file   Social History Narrative     Not on file     Social Drivers of Health     Financial Resource Strain: Low Risk  (12/29/2023)    Received from [facility]    Overall Financial Resource Strain (CARDIA)      Difficulty of Paying Living Expenses: Not hard at all   Food Insecurity: No Food Insecurity (12/29/2023)    Received from [facility]    Hunger Vital Sign      Worried About Running Out of Food in the Last Year: Never true      Ran Out of Food in the Last Year: Never true   Transportation Needs: No Transportation Needs (12/29/2023)    Received from [facility]    PRAPARE - Transportation      Lack of Transportation (Medical): No      Lack of Transportation (Non-Medical): No   Physical Activity: Not on file   Stress: Not on file   Social Connections: Not on file   Interpersonal Safety: Not At Risk (2/20/2024)    [facility]    Humiliation, Afraid, Rape, and Kick questionnaire      Fear of Current or Ex-Partner: No      Emotionally Abused: No      Physically Abused: No      Sexually Abused: No   Housing Stability: Low Risk  (12/29/2023)    Received from [facility]      Retired - What is your living situation today? : I have a steady place to live     Physical exam:  Vitals:/76   Pulse 83   Temp 99.4  F (37.4  C) (Oral)   Resp 18   Ht 1.562 m (5' 1.5\")   Wt 70.3 kg (155 lb)   SpO2 97%   BMI 28.81 kg/m    Gen: NAD, pleasant, interactive and answering questions appropriately, PS 0  HEENT: " Normocephalic atraumatic, sclera anicteric  Neck: Full range of motion  Lungs: No respiratory distress, speaking in full sentences, no coughing  Skin: no facial rash noted  Psych: normal affect  Neuro: normal gait      Labs:  Lab Results   Component Value Date    WBC 12.5 (H) 06/19/2023    HGB 13.2 06/19/2023    HCT 40.0 06/19/2023     06/19/2023     10/08/2020    POTASSIUM 4.3 10/08/2020    CHLORIDE 111 (H) 10/08/2020    CO2 25 10/08/2020    BUN 8 10/08/2020    CR 0.64 10/08/2020    GLC 79 10/08/2020       Radiology: Reviewed and see history of present illness    Pathology: Reviewed and see history of present illness    Assessment/plan:   Geraldine Neves is a 34 year old female whom I was asked to see by Dixie JOHNSON CNP for a recently diagnosed left clinical T1c(m) vs T2N0 hormone receptor positive, HER2 positive breast cancer.    1.  Breast Cancer: We reviewed her diagnosis, prognosis and treatment options.  She has already met with surgery. Given her premenopausal status, I recommend genetic testing and she wishes to proceed. Will coordinate in the future as she is still finalizing her treatment plan and which healthcare system.    I discussed my role as a medical oncologist. Given the fact that her cancer is HER2 positive, I recommend neoadjuvant chemotherapy with HER2 directed therapy.  We discussed SOC TCH-P x 6 cycles vs participation in ISPY2.2 (evaluating new medications in the neoadjuvant setting as well as intensive monitoring to help determine optimal timing of surgery). We had a long discussion about randomization and different blocks.  Ultimately she may be interested and I will have our clinical trial nurse contact her for additional information.  We reviewed the trial would include echo, PET/CT, MRI, repeat biopsy and genetic testing.    We reviewed the schedule of TCH-P as well as the side effects of therapy in general including but not limited to alopecia, fatigue, taste  changes, anorexia, mucositis, nausea, vomiting, constipation, diarrhea, myelosuppression, bleeding, need for transfusions, neuropathy, skin changes, renal toxicity, hepatic toxicity, cardiotoxicity,  risk of infection, infertility, early menopause and allergic reactions. She will need an echo before starting therapy. Ports are often placed given the duration of therapy.     I do think neoadjuvant therapy is the best option for her.  If she adamantly declines chemotherapy, one could consider HP alone but this would be very nonstandard particularly at her age and likely less effective than chemotherapy + HP.     For any neoadjuvant therapy she opts for, we reviewed that decisions regarding adjuvant therapy would be based on final pathology at surgical resection. We reviewed Herceptin vs Kadcyla.    We discussed the rationale of anti-hormonal therapy to reduce the risk of cancer recurrence and briefly discussed tamoxifen vs OFS. She does not plan for future pregnancies.    All of the patient's questions were answered.    Return to the office pending her decision.    Thank you for allowing me to participate in the care of your patient.  Please call with any questions.    I personally reviewed the recent studies and I explained the rationale of the tests ordered today to the patient.     No orders of the defined types were placed in this encounter.      Again, thank you for allowing me to participate in the care of your patient.        Sincerely,        Lucia Linda MD    Electronically signed

## 2025-02-24 NOTE — LETTER
2025         RE: Geraldine Neves  [address]  [facility] [address]      Oncology consultation    Name: Geraldine Neves  : 1990  MRN: [id number]    Requesting physician:ALEX Lim CNP  [address]  [facility] [place] [address]    Reason for consultation: Breast Cancer    HPI: Geraldine Neves is a 34 year old female whom I was asked to see by Dixie JOHNSON CNP for a recently diagnosed left clinical T1c(m) vs T2N0 hormone receptor positive, HER2 positive breast cancer.    She presented with a left breast mass after completing lactation (she is approximately 14 months postpartum) which was confirmed on diagnostic imaging to be 2 adjuvant 1.6cm masses. Biopsy revealed invasive ductal carcinoma, hormone receptor positive, HER2 positive and the other mass  invasive ductal carcinoma, estrogen receptor positive,progesterone receptor negative, HER2 positive. A borderline lymph node was evaluated and radiology did not feel it warranted a biopsy. She was seen by Dr. Simpson last week who recommended neoadjuvant therapy. She was seen by Dr. German at [facility] on 25.      She did have recurrent mastitis with first pregnancy so did a slow pumping wean with second child. No plans for additional pregnancies. She has met with a naturopath. She is a dietician.      SH:     Age at first delivery: age 30  Menarche: 13  Menses: regular, LMP now      FH: no breast or ovarian cancer, + prostate cancer (MGF).      Review of systems: All other systems reviewed and are negative except for what is described in the history of present illness.      PMH:   Patient Active Problem List    Diagnosis Date Noted     Malignant neoplasm of nipple of left breast in female, estrogen receptor negative (H) 2025     Priority: Medium     Hx of maternal cervical laceration, currently pregnant      Priority: Medium     AoDesc C/S 10/2020 with uterine extension into the left  cervix. Recommend cervical length screening for future pregnancy       S/P  section 10/07/2020     Priority: Medium     Meconium in amniotic fluid 10/06/2020     Priority: Medium     Post-term pregnancy, 40-42 weeks of gestation 10/06/2020     Priority: Medium     Pregnancy with prenatal care elsewhere in third trimester 10/06/2020     Priority: Medium     Patient with planned Out of Hospital Birth is transferring by car to the hospital for thick Meconium Stained fluid at 42 weeks.  Had Reactive NST 10/5 and then Took Castor oil x1. Had initially clear fluid that was then noted thick meconium.    Patient has been seen by [facility] for prenatal care.  Transferring midwife name(s),/birth center & phone number: [facility]  [phone]  Midwife here supporting patient: No  Records received, reviewed and scanned into chart.          Positive GBS test 10/06/2020     Priority: Medium     Past Medical History:   Diagnosis Date     Hx of maternal cervical laceration, currently pregnant     AoDesc C/S 10/2020 with uterine extension into the left cervix. Recommend cervical length screening for future pregnancy       Medications:   Current Outpatient Medications:      fish oil-omega-3 fatty acids 1000 MG capsule, Take 2 g by mouth., Disp: , Rfl:      Magnesium Citrate (CITROMA PO), , Disp: , Rfl:      Prenatal Vit-Fe Fumarate-FA (PRENATAL PLUS) 27-1 MG TABS, Take 1 tablet by mouth daily., Disp: , Rfl:      senna-docusate (SENOKOT-S/PERICOLACE) 8.6-50 MG tablet, Take 1 tablet by mouth 2 times daily, Disp: 60 tablet, Rfl: 0    Allergies: No Known Allergies    Social history:   Social History     Socioeconomic History     Marital status:      Spouse name: Not on file     Number of children: Not on file     Years of education: Not on file     Highest education level: Not on file   Occupational History     Not on file   Tobacco Use     Smoking status: Never     Smokeless tobacco: Never   Substance and Sexual  "Activity     Alcohol use: Not Currently     Drug use: Not Currently     Sexual activity: Yes     Partners: Male   Other Topics Concern     Not on file   Social History Narrative     Not on file     Social Drivers of Health     Financial Resource Strain: Low Risk  (12/29/2023)    Received from [facility]    Overall Financial Resource Strain (CARDIA)      Difficulty of Paying Living Expenses: Not hard at all   Food Insecurity: No Food Insecurity (12/29/2023)    Received from [facility]    Hunger Vital Sign      Worried About Running Out of Food in the Last Year: Never true      Ran Out of Food in the Last Year: Never true   Transportation Needs: No Transportation Needs (12/29/2023)    Received from [facility]    PRAPARE - Transportation      Lack of Transportation (Medical): No      Lack of Transportation (Non-Medical): No   Physical Activity: Not on file   Stress: Not on file   Social Connections: Not on file   Interpersonal Safety: Not At Risk (2/20/2024)    [facility]    Humiliation, Afraid, Rape, and Kick questionnaire      Fear of Current or Ex-Partner: No      Emotionally Abused: No      Physically Abused: No      Sexually Abused: No   Housing Stability: Low Risk  (12/29/2023)    Received from [facility]      Retired - What is your living situation today? : I have a steady place to live     Physical exam:  Vitals:/76   Pulse 83   Temp 99.4  F (37.4  C) (Oral)   Resp 18   Ht 1.562 m (5' 1.5\")   Wt 70.3 kg (155 lb)   SpO2 97%   BMI 28.81 kg/m    Gen: NAD, pleasant, interactive and answering questions appropriately, PS 0  HEENT: Normocephalic atraumatic, sclera anicteric  Neck: Full range of motion  Lungs: No respiratory distress, speaking in full sentences, no coughing  Skin: no facial " rash noted  Psych: normal affect  Neuro: normal gait      Labs:  Lab Results   Component Value Date    WBC 12.5 (H) 06/19/2023    HGB 13.2 06/19/2023    HCT 40.0 06/19/2023     06/19/2023     10/08/2020    POTASSIUM 4.3 10/08/2020    CHLORIDE 111 (H) 10/08/2020    CO2 25 10/08/2020    BUN 8 10/08/2020    CR 0.64 10/08/2020    GLC 79 10/08/2020       Radiology: Reviewed and see history of present illness    Pathology: Reviewed and see history of present illness    Assessment/plan:   Geraldine Neves is a 34 year old female whom I was asked to see by Dixie JOHNSON CNP for a recently diagnosed left clinical T1c(m) vs T2N0 hormone receptor positive, HER2 positive breast cancer.    1.  Breast Cancer: We reviewed her diagnosis, prognosis and treatment options.  She has already met with surgery. Given her premenopausal status, I recommend genetic testing and she wishes to proceed. Will coordinate in the future as she is still finalizing her treatment plan and which healthcare system.    I discussed my role as a medical oncologist. Given the fact that her cancer is HER2 positive, I recommend neoadjuvant chemotherapy with HER2 directed therapy.  We discussed SOC TCH-P x 6 cycles vs participation in ISPY2.2 (evaluating new medications in the neoadjuvant setting as well as intensive monitoring to help determine optimal timing of surgery). We had a long discussion about randomization and different blocks.  Ultimately she may be interested and I will have our clinical trial nurse contact her for additional information.  We reviewed the trial would include echo, PET/CT, MRI, repeat biopsy and genetic testing.    We reviewed the schedule of TCH-P as well as the side effects of therapy in general including but not limited to alopecia, fatigue, taste changes, anorexia, mucositis, nausea, vomiting, constipation, diarrhea, myelosuppression, bleeding, need for transfusions, neuropathy, skin changes, renal  toxicity, hepatic toxicity, cardiotoxicity,  risk of infection, infertility, early menopause and allergic reactions. She will need an echo before starting therapy. Ports are often placed given the duration of therapy.     I do think neoadjuvant therapy is the best option for her.  If she adamantly declines chemotherapy, one could consider HP alone but this would be very nonstandard particularly at her age and likely less effective than chemotherapy + HP.     For any neoadjuvant therapy she opts for, we reviewed that decisions regarding adjuvant therapy would be based on final pathology at surgical resection. We reviewed Herceptin vs Kadcyla.    We discussed the rationale of anti-hormonal therapy to reduce the risk of cancer recurrence and briefly discussed tamoxifen vs OFS. She does not plan for future pregnancies.    All of the patient's questions were answered.    Return to the office pending her decision.    Thank you for allowing me to participate in the care of your patient.  Please call with any questions.    I personally reviewed the recent studies and I explained the rationale of the tests ordered today to the patient.     No orders of the defined types were placed in this encounter.        Lucia Linda MD

## 2025-02-24 NOTE — NURSING NOTE
"Oncology Rooming Note    February 24, 2025 12:33 PM   Geraldine Neves is a 34 year old female who presents for:    Chief Complaint   Patient presents with    Oncology Clinic Visit      New Oncology     Initial Vitals: /76   Pulse 83   Temp 99.4  F (37.4  C) (Oral)   Resp 18   Ht 1.562 m (5' 1.5\")   Wt 70.3 kg (155 lb)   SpO2 97%   BMI 28.81 kg/m   Estimated body mass index is 28.81 kg/m  as calculated from the following:    Height as of this encounter: 1.562 m (5' 1.5\").    Weight as of this encounter: 70.3 kg (155 lb). Body surface area is 1.75 meters squared.  No Pain (0) Comment: Data Unavailable   No LMP recorded.  Allergies reviewed: Yes  Medications reviewed: Yes    Medications: Medication refills not needed today.  Pharmacy name entered into [facility]: [facility] [id number] [place] [address] [address]    Frailty Screening:   Is the patient here for a new oncology consult visit in cancer care? 1. Yes. Over the past month, have you experienced difficulty or required a caregiver to assist with:   1. Balance, walking or general mobility (including any falls)? NO  2. Completion of self-care tasks such as bathing, dressing, toileting, grooming/hygiene?  NO  3. Concentration or memory that affects your daily life?  NO     PHQ9:  Did this patient require a PHQ9?: No      Clinical concerns: none      Reji Suarez LPN              "

## 2025-02-24 NOTE — PROGRESS NOTES
Oncology consultation    Name: Geraldine Neves  : 1990  MRN: [id number]    Requesting physician:ALEX Lim CNP  [address] [place]  [place] [id number]    Reason for consultation: Breast Cancer    HPI: Geraldine Neves is a 34 year old female whom I was asked to see by Dixie JOHNSON CNP for a recently diagnosed left clinical T1c(m) vs T2N0 hormone receptor positive, HER2 positive breast cancer.    She presented with a left breast mass after completing lactation (she is approximately 14 months postpartum) which was confirmed on diagnostic imaging to be 2 adjuvant 1.6cm masses. Biopsy revealed invasive ductal carcinoma, hormone receptor positive, HER2 positive and the other mass  invasive ductal carcinoma, estrogen receptor positive,progesterone receptor negative, HER2 positive. A borderline lymph node was evaluated and radiology did not feel it warranted a biopsy. She was seen by Dr. Simpson last week who recommended neoadjuvant therapy. She was seen by Dr. German at [facility] on 25.      She did have recurrent mastitis with first pregnancy so did a slow pumping wean with second child. No plans for additional pregnancies. She has met with a naturopath. She is a dietician.      SH:     Age at first delivery: age 30  Menarche: 13  Menses: regular, LMP now      FH: no breast or ovarian cancer, + prostate cancer (MGF).      Review of systems: All other systems reviewed and are negative except for what is described in the history of present illness.      PMH:   Patient Active Problem List    Diagnosis Date Noted    Malignant neoplasm of nipple of left breast in female, estrogen receptor negative (H) 2025     Priority: Medium    Hx of maternal cervical laceration, currently pregnant      Priority: Medium     AoDesc C/S 10/2020 with uterine extension into the left cervix. Recommend cervical length screening for future pregnancy      S/P  section  10/07/2020     Priority: Medium    Meconium in amniotic fluid 10/06/2020     Priority: Medium    Post-term pregnancy, 40-42 weeks of gestation 10/06/2020     Priority: Medium    Pregnancy with prenatal care elsewhere in third trimester 10/06/2020     Priority: Medium     Patient with planned Out of Hospital Birth is transferring by car to the hospital for thick Meconium Stained fluid at 42 weeks.  Had Reactive NST 10/5 and then Took Castor oil x1. Had initially clear fluid that was then noted thick meconium.    Patient has been seen by [facility] for prenatal care.  Transferring midwife name(s),/birth center & phone number: [facility]  [phone]  Midwife here supporting patient: No  Records received, reviewed and scanned into chart.         Positive GBS test 10/06/2020     Priority: Medium     Past Medical History:   Diagnosis Date    Hx of maternal cervical laceration, currently pregnant     [facility] cerv[facility]. Recommend cervical length screening for future pregnancy       Medications:   Current Outpatient Medications:     fish oil-omega-3 fatty acids 1000 MG capsule, Take 2 g by mouth., Disp: , Rfl:     Magnesium Citrate (CITROMA PO), , Disp: , Rfl:     Prenatal Vit-Fe Fumarate-FA (PRENATAL PLUS) 27-1 MG TABS, Take 1 tablet by mouth daily., Disp: , Rfl:     senna-docusate (SENOKOT-S/PERICOLACE) 8.6-50 MG tablet, Take 1 tablet by mouth 2 times daily, Disp: 60 tablet, Rfl: 0    Allergies: No Known Allergies    Social history:   Social History     Socioeconomic History    Marital status:      Spouse name: Not on file    Number of children: Not on file    Years of education: Not on file    Highest education level: Not on file   Occupational History    Not on file   Tobacco Use    Smoking status: Never    Smokeless tobacco: Never   Substance and Sexual Activity    Alcohol use: Not Currently    Drug use: Not Currently    Sexual activity: Yes     Partners: Male  "  Other Topics Concern    Not on file   Social History Narrative    Not on file     Social Drivers of Health     Financial Resource Strain: Low Risk  (12/29/2023)    Received from [facility]    Overall Financial Resource Strain (CARDIA)     Difficulty of Paying Living Expenses: Not hard at all   Food Insecurity: No Food Insecurity (12/29/2023)    Received from [facility]    Hunger Vital Sign     Worried About Running Out of Food in the Last Year: Never true     Ran Out of Food in the Last Year: Never true   Transportation Needs: No Transportation Needs (12/29/2023)    Received from [facility]    PRAPARE - Transportation     Lack of Transportation (Medical): No     Lack of Transportation (Non-Medical): No   Physical Activity: Not on file   Stress: Not on file   Social Connections: Not on file   Interpersonal Safety: Not At Risk (2/20/2024)    Received from [facility]    Humiliation, Afraid, Rape, and Kick questionnaire     Fear of Current or Ex-Partner: No     Emotionally Abused: No     Physically Abused: No     Sexually Abused: No   Housing Stability: Low Risk  (12/29/2023)    Received from [facility]     Retired - What is your living situation today? : I have a steady place to live     Physical exam:  Vitals:/76   Pulse 83   Temp 99.4  F (37.4  C) (Oral)   Resp 18   Ht 1.562 m (5' 1.5\")   Wt 70.3 kg (155 lb)   SpO2 97%   BMI 28.81 kg/m    Gen: NAD, pleasant, interactive and answering questions appropriately, PS 0  HEENT: Normocephalic atraumatic, sclera anicteric  Neck: Full range of motion  Lungs: No respiratory distress, speaking in full sentences, no coughing  Skin: no facial rash noted  Psych: normal affect  Neuro: normal gait      Labs:  Lab Results   Component Value Date    WBC 12.5 (H) " 06/19/2023    HGB 13.2 06/19/2023    HCT 40.0 06/19/2023     06/19/2023     10/08/2020    POTASSIUM 4.3 10/08/2020    CHLORIDE 111 (H) 10/08/2020    CO2 25 10/08/2020    BUN 8 10/08/2020    CR 0.64 10/08/2020    GLC 79 10/08/2020       Radiology: Reviewed and see history of present illness    Pathology: Reviewed and see history of present illness    Assessment/plan:   Geraldine Neves is a 34 year old female whom I was asked to see by Dixie JOHNSON CNP for a recently diagnosed left clinical T1c(m) vs T2N0 hormone receptor positive, HER2 positive breast cancer.    1.  Breast Cancer: We reviewed her diagnosis, prognosis and treatment options.  She has already met with surgery. Given her premenopausal status, I recommend genetic testing and she wishes to proceed. Will coordinate in the future as she is still finalizing her treatment plan and which healthcare system.    I discussed my role as a medical oncologist. Given the fact that her cancer is HER2 positive, I recommend neoadjuvant chemotherapy with HER2 directed therapy.  We discussed SOC TCH-P x 6 cycles vs participation in ISPY2.2 (evaluating new medications in the neoadjuvant setting as well as intensive monitoring to help determine optimal timing of surgery). We had a long discussion about randomization and different blocks.  Ultimately she may be interested and I will have our clinical trial nurse contact her for additional information.  We reviewed the trial would include echo, PET/CT, MRI, repeat biopsy and genetic testing.    We reviewed the schedule of TCH-P as well as the side effects of therapy in general including but not limited to alopecia, fatigue, taste changes, anorexia, mucositis, nausea, vomiting, constipation, diarrhea, myelosuppression, bleeding, need for transfusions, neuropathy, skin changes, renal toxicity, hepatic toxicity, cardiotoxicity,  risk of infection, infertility, early menopause and allergic reactions. She  will need an echo before starting therapy. Ports are often placed given the duration of therapy.     I do think neoadjuvant therapy is the best option for her.  If she adamantly declines chemotherapy, one could consider HP alone but this would be very nonstandard particularly at her age and likely less effective than chemotherapy + HP.     For any neoadjuvant therapy she opts for, we reviewed that decisions regarding adjuvant therapy would be based on final pathology at surgical resection. We reviewed Herceptin vs Kadcyla.    We discussed the rationale of anti-hormonal therapy to reduce the risk of cancer recurrence and briefly discussed tamoxifen vs OFS. She does not plan for future pregnancies.    All of the patient's questions were answered.    Return to the office pending her decision.    Thank you for allowing me to participate in the care of your patient.  Please call with any questions.    I personally reviewed the recent studies and I explained the rationale of the tests ordered today to the patient.     No orders of the defined types were placed in this encounter.

## 2025-02-26 LAB
PATH REPORT.COMMENTS IMP SPEC: ABNORMAL
PATH REPORT.COMMENTS IMP SPEC: YES
PATH REPORT.FINAL DX SPEC: ABNORMAL
PATH REPORT.GROSS SPEC: ABNORMAL
PATH REPORT.MICROSCOPIC SPEC OTHER STN: ABNORMAL
PATH REPORT.RELEVANT HX SPEC: ABNORMAL
PATH REPORT.RELEVANT HX SPEC: ABNORMAL
PATH REPORT.SITE OF ORIGIN SPEC: ABNORMAL

## 2025-02-27 ENCOUNTER — ALLIED HEALTH/NURSE VISIT (OUTPATIENT)
Dept: ONCOLOGY | Facility: CLINIC | Age: 35
End: 2025-02-27
Payer: COMMERCIAL

## 2025-02-27 DIAGNOSIS — Z17.0 MALIGNANT NEOPLASM OF LEFT BREAST IN FEMALE, ESTROGEN RECEPTOR POSITIVE, UNSPECIFIED SITE OF BREAST (H): ICD-10-CM

## 2025-02-27 DIAGNOSIS — C50.912 MALIGNANT NEOPLASM OF LEFT BREAST IN FEMALE, ESTROGEN RECEPTOR POSITIVE, UNSPECIFIED SITE OF BREAST (H): ICD-10-CM

## 2025-02-27 DIAGNOSIS — Z00.6 EXAMINATION OF PARTICIPANT OR CONTROL IN CLINICAL RESEARCH: Primary | ICD-10-CM

## 2025-02-27 NOTE — NURSING NOTE
[id number]: Informed Consent Note     The consent form, including purpose, risks and benefits, was reviewed with Geraldine Neves, and all questions were answered before she signed the consent form. The patient understands that the study involves an active treatment phase as well as a post-treatment follow up phase.     Present during the discussion was Geraldine. A copy of the signed form was provided to the patient. No procedures specific to this study were performed prior to the patient signing the consent form.    Consent Version Date: [id number]  Consent obtained by: Angeles Bustillos RN    Date: 27FEB2025  HIPAA authorization signed?: yes  HIPAA authorization version date: [id number]  RACE AND ETHNICITY:  I discussed with Geraldine Neves that the [employer] (NCI) has asked that information on race and ethnicity be obtained from NCI-sponsored studies' participants whenever possible and that the purpose for this request is to assist the NCI in evaluating whether persons of all races and ethnicity are given the opportunity to participate in new cancer treatment options. It was explained that the information will be sent to the NCI in a way in which she cannot be identified. It was also explained that providing this information is voluntary.  Geraldine Neves provided the following responses:  Ethnicity: non   Race: White   Angeles Bustillos RN    Form 503.03.01 (Version 2)     Effective date: 01AUG2018     Next Review Date: 01AUG2020

## 2025-03-04 ENCOUNTER — ANCILLARY PROCEDURE (OUTPATIENT)
Dept: MRI IMAGING | Facility: CLINIC | Age: 35
End: 2025-03-04
Attending: INTERNAL MEDICINE

## 2025-03-04 DIAGNOSIS — Z17.0 MALIGNANT NEOPLASM OF LEFT BREAST IN FEMALE, ESTROGEN RECEPTOR POSITIVE, UNSPECIFIED SITE OF BREAST (H): ICD-10-CM

## 2025-03-04 DIAGNOSIS — C50.912 MALIGNANT NEOPLASM OF LEFT BREAST IN FEMALE, ESTROGEN RECEPTOR POSITIVE, UNSPECIFIED SITE OF BREAST (H): ICD-10-CM

## 2025-03-04 DIAGNOSIS — Z00.6 EXAMINATION OF PARTICIPANT OR CONTROL IN CLINICAL RESEARCH: ICD-10-CM

## 2025-03-04 RX ORDER — GADOBUTROL 604.72 MG/ML
7.5 INJECTION INTRAVENOUS ONCE
Status: COMPLETED | OUTPATIENT
Start: 2025-03-04 | End: 2025-03-04

## 2025-03-04 RX ADMIN — GADOBUTROL 7 ML: 604.72 INJECTION INTRAVENOUS at 09:35

## 2025-03-06 ENCOUNTER — ANCILLARY PROCEDURE (OUTPATIENT)
Dept: MAMMOGRAPHY | Facility: CLINIC | Age: 35
End: 2025-03-06
Attending: INTERNAL MEDICINE
Payer: COMMERCIAL

## 2025-03-06 ENCOUNTER — ANCILLARY PROCEDURE (OUTPATIENT)
Dept: CARDIOLOGY | Facility: CLINIC | Age: 35
End: 2025-03-06
Attending: INTERNAL MEDICINE
Payer: COMMERCIAL

## 2025-03-06 ENCOUNTER — LAB (OUTPATIENT)
Dept: LAB | Facility: CLINIC | Age: 35
End: 2025-03-06
Attending: INTERNAL MEDICINE
Payer: COMMERCIAL

## 2025-03-06 DIAGNOSIS — Z85.3 HISTORY OF INVASIVE BREAST CANCER: ICD-10-CM

## 2025-03-06 DIAGNOSIS — Z00.6 RESEARCH EXAM: ICD-10-CM

## 2025-03-06 DIAGNOSIS — Z00.6 EXAMINATION OF PARTICIPANT OR CONTROL IN CLINICAL RESEARCH: ICD-10-CM

## 2025-03-06 DIAGNOSIS — C50.912 MALIGNANT NEOPLASM OF LEFT BREAST IN FEMALE, ESTROGEN RECEPTOR POSITIVE, UNSPECIFIED SITE OF BREAST (H): ICD-10-CM

## 2025-03-06 DIAGNOSIS — Z17.0 MALIGNANT NEOPLASM OF LEFT BREAST IN FEMALE, ESTROGEN RECEPTOR POSITIVE, UNSPECIFIED SITE OF BREAST (H): ICD-10-CM

## 2025-03-06 LAB
ALBUMIN SERPL BCG-MCNC: 4.5 G/DL (ref 3.5–5.2)
ALP SERPL-CCNC: 64 U/L (ref 40–150)
ALT SERPL W P-5'-P-CCNC: 16 U/L (ref 0–50)
ANION GAP SERPL CALCULATED.3IONS-SCNC: 10 MMOL/L (ref 7–15)
APTT PPP: 28 SECONDS (ref 22–38)
AST SERPL W P-5'-P-CCNC: 17 U/L (ref 0–45)
ATRIAL RATE - MUSE: 75 BPM
BASOPHILS # BLD AUTO: 0.1 10E3/UL (ref 0–0.2)
BASOPHILS NFR BLD AUTO: 1 %
BILIRUB SERPL-MCNC: 0.3 MG/DL
BUN SERPL-MCNC: 10.3 MG/DL (ref 6–20)
CALCIUM SERPL-MCNC: 9.5 MG/DL (ref 8.8–10.4)
CHLORIDE SERPL-SCNC: 106 MMOL/L (ref 98–107)
CORTIS SERPL-MCNC: 24.9 UG/DL
CREAT SERPL-MCNC: 0.74 MG/DL (ref 0.51–0.95)
DIASTOLIC BLOOD PRESSURE - MUSE: NORMAL MMHG
EGFRCR SERPLBLD CKD-EPI 2021: >90 ML/MIN/1.73M2
EOSINOPHIL # BLD AUTO: 0 10E3/UL (ref 0–0.7)
EOSINOPHIL NFR BLD AUTO: 1 %
ERYTHROCYTE [DISTWIDTH] IN BLOOD BY AUTOMATED COUNT: 13 % (ref 10–15)
EST. AVERAGE GLUCOSE BLD GHB EST-MCNC: 105 MG/DL
GLUCOSE SERPL-MCNC: 91 MG/DL (ref 70–99)
HBA1C MFR BLD: 5.3 %
HCG UR QL: NEGATIVE
HCO3 SERPL-SCNC: 25 MMOL/L (ref 22–29)
HCT VFR BLD AUTO: 43.5 % (ref 35–47)
HGB BLD-MCNC: 14 G/DL (ref 11.7–15.7)
IMM GRANULOCYTES # BLD: 0 10E3/UL
IMM GRANULOCYTES NFR BLD: 0 %
INR PPP: 1.03 (ref 0.85–1.15)
INTERPRETATION ECG - MUSE: NORMAL
LVEF ECHO: NORMAL
LYMPHOCYTES # BLD AUTO: 2.1 10E3/UL (ref 0.8–5.3)
LYMPHOCYTES NFR BLD AUTO: 37 %
MAGNESIUM SERPL-MCNC: 2.2 MG/DL (ref 1.7–2.3)
MCH RBC QN AUTO: 29.8 PG (ref 26.5–33)
MCHC RBC AUTO-ENTMCNC: 32.2 G/DL (ref 31.5–36.5)
MCV RBC AUTO: 93 FL (ref 78–100)
MONOCYTES # BLD AUTO: 0.4 10E3/UL (ref 0–1.3)
MONOCYTES NFR BLD AUTO: 7 %
NEUTROPHILS # BLD AUTO: 3.2 10E3/UL (ref 1.6–8.3)
NEUTROPHILS NFR BLD AUTO: 54 %
NRBC # BLD AUTO: 0 10E3/UL
NRBC BLD AUTO-RTO: 0 /100
P AXIS - MUSE: -1 DEGREES
PLATELET # BLD AUTO: 252 10E3/UL (ref 150–450)
POTASSIUM SERPL-SCNC: 4.1 MMOL/L (ref 3.4–5.3)
PR INTERVAL - MUSE: 156 MS
PROT SERPL-MCNC: 7.5 G/DL (ref 6.4–8.3)
QRS DURATION - MUSE: 96 MS
QT - MUSE: 364 MS
QTC - MUSE: 406 MS
R AXIS - MUSE: 27 DEGREES
RBC # BLD AUTO: 4.7 10E6/UL (ref 3.8–5.2)
SODIUM SERPL-SCNC: 141 MMOL/L (ref 135–145)
SYSTOLIC BLOOD PRESSURE - MUSE: NORMAL MMHG
T AXIS - MUSE: 23 DEGREES
T4 FREE SERPL-MCNC: 1.25 NG/DL (ref 0.9–1.7)
TSH SERPL DL<=0.005 MIU/L-ACNC: 2.21 UIU/ML (ref 0.3–4.2)
VENTRICULAR RATE- MUSE: 75 BPM
WBC # BLD AUTO: 5.8 10E3/UL (ref 4–11)

## 2025-03-06 PROCEDURE — 85018 HEMOGLOBIN: CPT

## 2025-03-06 PROCEDURE — 83036 HEMOGLOBIN GLYCOSYLATED A1C: CPT

## 2025-03-06 PROCEDURE — 82247 BILIRUBIN TOTAL: CPT

## 2025-03-06 PROCEDURE — 85730 THROMBOPLASTIN TIME PARTIAL: CPT

## 2025-03-06 PROCEDURE — 84443 ASSAY THYROID STIM HORMONE: CPT

## 2025-03-06 PROCEDURE — 85004 AUTOMATED DIFF WBC COUNT: CPT

## 2025-03-06 PROCEDURE — 93356 MYOCRD STRAIN IMG SPCKL TRCK: CPT | Performed by: INTERNAL MEDICINE

## 2025-03-06 PROCEDURE — 82435 ASSAY OF BLOOD CHLORIDE: CPT

## 2025-03-06 PROCEDURE — 84439 ASSAY OF FREE THYROXINE: CPT

## 2025-03-06 PROCEDURE — 83735 ASSAY OF MAGNESIUM: CPT

## 2025-03-06 PROCEDURE — 81025 URINE PREGNANCY TEST: CPT

## 2025-03-06 PROCEDURE — 82533 TOTAL CORTISOL: CPT

## 2025-03-06 PROCEDURE — 85610 PROTHROMBIN TIME: CPT

## 2025-03-06 PROCEDURE — 19083 BX BREAST 1ST LESION US IMAG: CPT | Mod: LT | Performed by: RADIOLOGY

## 2025-03-06 PROCEDURE — 84520 ASSAY OF UREA NITROGEN: CPT

## 2025-03-06 PROCEDURE — 36415 COLL VENOUS BLD VENIPUNCTURE: CPT

## 2025-03-06 PROCEDURE — 93306 TTE W/DOPPLER COMPLETE: CPT | Mod: GC | Performed by: INTERNAL MEDICINE

## 2025-03-06 NOTE — NURSING NOTE
Chief Complaint   Patient presents with    Blood Draw     Labs drawn from venipuncture by RN     Labs collected from venipuncture by RN. Urine sample collected. Pt instructed to check into her next appt on 3rd floor.    Mami Hardwick RN

## 2025-03-08 ENCOUNTER — HOSPITAL ENCOUNTER (OUTPATIENT)
Dept: PET IMAGING | Facility: CLINIC | Age: 35
Discharge: HOME | End: 2025-03-08
Attending: INTERNAL MEDICINE | Admitting: STUDENT IN AN ORGANIZED HEALTH CARE EDUCATION/TRAINING PROGRAM
Payer: COMMERCIAL

## 2025-03-08 DIAGNOSIS — Z00.6 EXAMINATION OF PARTICIPANT OR CONTROL IN CLINICAL RESEARCH: ICD-10-CM

## 2025-03-08 DIAGNOSIS — C50.912 MALIGNANT NEOPLASM OF LEFT BREAST IN FEMALE, ESTROGEN RECEPTOR POSITIVE, UNSPECIFIED SITE OF BREAST (H): ICD-10-CM

## 2025-03-08 DIAGNOSIS — Z17.0 MALIGNANT NEOPLASM OF LEFT BREAST IN FEMALE, ESTROGEN RECEPTOR POSITIVE, UNSPECIFIED SITE OF BREAST (H): ICD-10-CM

## 2025-03-08 PROCEDURE — 343N000001 HC RX 343 MED OP 636: Performed by: INTERNAL MEDICINE

## 2025-03-08 PROCEDURE — A9552 F18 FDG: HCPCS | Performed by: INTERNAL MEDICINE

## 2025-03-08 PROCEDURE — 71260 CT THORAX DX C+: CPT

## 2025-03-08 PROCEDURE — 78815 PET IMAGE W/CT SKULL-THIGH: CPT | Mod: PI

## 2025-03-08 PROCEDURE — 250N000011 HC RX IP 250 OP 636: Performed by: INTERNAL MEDICINE

## 2025-03-08 RX ORDER — IOPAMIDOL 755 MG/ML
10-135 INJECTION, SOLUTION INTRAVASCULAR ONCE
Status: COMPLETED | OUTPATIENT
Start: 2025-03-08 | End: 2025-03-08

## 2025-03-08 RX ORDER — FLUDEOXYGLUCOSE F 18 200 MCI/ML
10-18 INJECTION, SOLUTION INTRAVENOUS ONCE
Status: COMPLETED | OUTPATIENT
Start: 2025-03-08 | End: 2025-03-08

## 2025-03-08 RX ADMIN — FLUDEOXYGLUCOSE F 18 13.24 MILLICURIE: 200 INJECTION, SOLUTION INTRAVENOUS at 12:13

## 2025-03-08 RX ADMIN — IOPAMIDOL 95 ML: 755 INJECTION, SOLUTION INTRAVENOUS at 13:08

## 2025-03-10 ENCOUNTER — VIRTUAL VISIT (OUTPATIENT)
Dept: ONCOLOGY | Facility: CLINIC | Age: 35
End: 2025-03-10
Attending: INTERNAL MEDICINE
Payer: COMMERCIAL

## 2025-03-10 VITALS — BODY MASS INDEX: 28.13 KG/M2 | HEIGHT: 61 IN | WEIGHT: 149 LBS

## 2025-03-10 DIAGNOSIS — C50.912 MALIGNANT NEOPLASM OF LEFT BREAST IN FEMALE, ESTROGEN RECEPTOR POSITIVE, UNSPECIFIED SITE OF BREAST (H): Primary | ICD-10-CM

## 2025-03-10 DIAGNOSIS — Z17.0 MALIGNANT NEOPLASM OF LEFT BREAST IN FEMALE, ESTROGEN RECEPTOR POSITIVE, UNSPECIFIED SITE OF BREAST (H): Primary | ICD-10-CM

## 2025-03-10 PROCEDURE — 98006 SYNCH AUDIO-VIDEO EST MOD 30: CPT | Performed by: INTERNAL MEDICINE

## 2025-03-10 PROCEDURE — 1125F AMNT PAIN NOTED PAIN PRSNT: CPT | Mod: 95 | Performed by: INTERNAL MEDICINE

## 2025-03-10 PROCEDURE — G2211 COMPLEX E/M VISIT ADD ON: HCPCS | Mod: 95 | Performed by: INTERNAL MEDICINE

## 2025-03-10 ASSESSMENT — PAIN SCALES - GENERAL: PAINLEVEL_OUTOF10: MILD PAIN (1)

## 2025-03-10 NOTE — NURSING NOTE
Patient confirms medications and allergies are accurate via patients echeck in completion, and or denies any changes since last reviewed/verified.       Current patient location: [address] [place] [id number]    Is the patient currently in the state of [place]? YES    Visit mode: VIDEO    If the visit is dropped, the patient can be reconnected by:VIDEO VISIT: Text to cell phone:   Telephone Information:   Mobile [phone]       Will anyone else be joining the visit? NO  (If patient encounters technical issues they should call [phone]    Are changes needed to the allergy or medication list? No    Are refills needed on medications prescribed by this physician? NO    Rooming Documentation:  Questionnaire(s) completed    Reason for visit: RECHECK    She GARCIA

## 2025-03-10 NOTE — PROGRESS NOTES
Oncology progress note    Name: Geraldine Neves  : 1990  MRN: [id number]    CC: Breast Cancer    HPI: Geraldine Neves is a 34 year old female with a recently diagnosed left clinical T1c(m) vs T2N0 hormone receptor positive, HER2 positive breast cancer.    She signed screening consent for ISPY and has had multiple tests done.  She does note  back pain. After consideration, she prefers to do surgery first - wants genetics done first.  She doesn't want to participate in ISPY, worried about IV access. Joined some support groups. She is struggling with the idea of doing chemotherapy. Asks about echo and EKG results.    Review of systems: All other systems reviewed and are negative except for what is described in the history of present illness.      PMH:   Patient Active Problem List    Diagnosis Date Noted    Malignant neoplasm of left breast in female, estrogen receptor positive, unspecified site of breast (H) 2025     Priority: Medium    Hx of maternal cervical laceration, currently pregnant      Priority: Medium     AoDesc C/S 10/2020 with uterine extension into the left cervix. Recommend cervical length screening for future pregnancy      S/P  section 10/07/2020     Priority: Medium    Meconium in amniotic fluid 10/06/2020     Priority: Medium    Post-term pregnancy, 40-42 weeks of gestation 10/06/2020     Priority: Medium    Pregnancy with prenatal care elsewhere in third trimester 10/06/2020     Priority: Medium     Patient with planned Out of Hospital Birth is transferring by car to the hospital for thick Meconium Stained fluid at 42 weeks.  Had Reactive NST 10/5 and then Took Castor oil x1. Had initially clear fluid that was then noted thick meconium.    Patient has been seen by [facility] for prenatal care.  Transferring midwife name(s),/birth center & phone number: [facility]  [phone]  Midwife here supporting patient: No  Records received, reviewed and scanned into  chart.         Positive GBS test 10/06/2020     Priority: Medium     Past Medical History:   Diagnosis Date    Hx of maternal cervical laceration, currently pregnant     [facility] into [facility] left cervix. Recommend cervical length screening for future pregnancy       Medications:   Current Outpatient Medications:     fish oil-omega-3 fatty acids 1000 MG capsule, Take 2 g by mouth., Disp: , Rfl:     Magnesium Citrate (CITROMA PO), , Disp: , Rfl:     Prenatal Vit-Fe Fumarate-FA (PRENATAL PLUS) 27-1 MG TABS, Take 1 tablet by mouth daily., Disp: , Rfl:     senna-docusate (SENOKOT-S/PERICOLACE) 8.6-50 MG tablet, Take 1 tablet by mouth 2 times daily, Disp: 60 tablet, Rfl: 0    Allergies: No Known Allergies    Social history:   Social History     Socioeconomic History    Marital status:      Spouse name: Not on file    Number of children: Not on file    Years of education: Not on file    Highest education level: Not on file   Occupational History    Not on file   Tobacco Use    Smoking status: Never    Smokeless tobacco: Never   Substance and Sexual Activity    Alcohol use: Not Currently    Drug use: Not Currently    Sexual activity: Yes     Partners: Male   Other Topics Concern    Not on file   Social History Narrative    Not on file     Social Drivers of Health     Financial Resource Strain: Low Risk  (12/29/2023)    [facility]    Overall Financial Resource Strain (CARDIA)     Difficulty of Paying Living Expenses: Not hard at all   Food Insecurity: No Food Insecurity (12/29/2023)    Received from [facility]    Hunger Vital Sign     Worried About Running Out of Food in the Last Year: Never true     Ran Out of Food in the Last Year: Never true   Transportation Needs: No Transportation Needs (12/29/2023)    Received from [facility]    PRAPARE - Transportation     Lack  "of Transportation (Medical): No     Lack of Transportation (Non-Medical): No   Physical Activity: Not on file   Stress: Not on file   Social Connections: Not on file   Interpersonal Safety: Not At Risk (2/20/2024)    Received from [facility]    Humiliation, Afraid, Rape, and Kick questionnaire     Fear of Current or Ex-Partner: No     Emotionally Abused: No     Physically Abused: No     Sexually Abused: No   Housing Stability: Low Risk  (12/29/2023)    Received from [facility]     Retired - What is your living situation today? : I have a steady place to live     Physical exam:  Vitals:Ht 1.549 m (5' 1\")   Wt 67.6 kg (149 lb)   BMI 28.15 kg/m    Gen: NAD, pleasant, interactive and answering questions appropriately, PS 0  HEENT: Normocephalic atraumatic, sclera anicteric  Neck: Full range of motion  Lungs: No respiratory distress, speaking in full sentences, no coughing  Skin: no facial rash noted  Psych: normal affect    Labs:      Radiology: Reviewed in EPIC    Assessment/plan:   Geraldine Neves is a 34 year old female with a recently diagnosed left clinical T1c(m) vs T2N0 hormone receptor positive, HER2 positive breast cancer.    1.  Breast Cancer: We reviewed her MRI and PET/CT which did not show any lexi or metastatic disease. Based on imaging there are 2 adjuvant masses each <2cm. She does wish to proceed with genetic testing and this has been ordered.  I recommend neoadjuvant therapy given the 2 tumors and their proximity. She is a candidate for ISPY2.2 but after consideration, she does not wish to participate.  We reviewed rationale of neoadjuvant therapy with TCH-P and the ability to adjust adjuvant therapy based on pathologic response.  If she opts for surgery first, if confirmed stage I, then would plan for Taxol/Herceptin.  If T2 or higher stage, then recommend adjuvant TCH-P followed by HP. She isn't " sure she want to proceed with chemotherapy but we agreed to reassess postoperatively. I will update Dr. Simpson for surgical planning.    2.  Incomplete RBBB: we reviewed this finding on EKG. Advised she follow up with PCP.    All of the patient's questions were answered.    Return to the office 2 weeks postoperatively.    Thank you for allowing me to participate in the care of your patient.  Please call with any questions.    I personally reviewed the recent studies and I explained the rationale of the tests ordered today to the patient.     The longitudinal plan of care for the diagnosis(es)/condition(s) as documented were addressed during this visit. Due to the added complexity in care, I will continue to support Geraldine in the subsequent management and with ongoing continuity of care.      No orders of the defined types were placed in this encounter.

## 2025-03-10 NOTE — PROGRESS NOTES
Virtual Visit Details    Type of service:  Video Visit     Originating Location (pt. Location): Home  Distant Location (provider location):  Off-site  Platform used for Video Visit: Yovany

## 2025-03-10 NOTE — LETTER
3/10/2025      Geraldine Neves  [address]  [facility] [address]      Dear Colleague,    Thank you for referring your patient, Geraldine Neves, to the [facility]. Please see a copy of my visit note below.    Virtual Visit Details    Type of service:  Video Visit     Originating Location (pt. Location): Home  Distant Location (provider location):  Off-site  Platform used for Video Visit: [facility]    Oncology progress note    Name: Geraldine Neves  : 1990  MRN: [id number]    CC: Breast Cancer    HPI: Geraldine Neves is a 34 year old female with a recently diagnosed left clinical T1c(m) vs T2N0 hormone receptor positive, HER2 positive breast cancer.    She signed screening consent for ISPY and has had multiple tests done.  She does note  back pain. After consideration, she prefers to do surgery first - wants genetics done first.  She doesn't want to participate in ISPY, worried about IV access. Joined some support groups. She is struggling with the idea of doing chemotherapy. Asks about echo and EKG results.    Review of systems: All other systems reviewed and are negative except for what is described in the history of present illness.      PMH:   Patient Active Problem List    Diagnosis Date Noted     Malignant neoplasm of left breast in female, estrogen receptor positive, unspecified site of breast (H) 2025     Priority: Medium     Hx of maternal cervical laceration, currently pregnant      Priority: Medium     [facility] C/S 10/2020 with uterine extension into the left cervix. Recommend cervical length screening for future pregnancy       S/P  section 10/07/2020     Priority: Medium     Meconium in amniotic fluid 10/06/2020     Priority: Medium     Post-term pregnancy, 40-42 weeks of gestation 10/06/2020     Priority: Medium     Pregnancy with prenatal care elsewhere in third trimester 10/06/2020     Priority: Medium     Patient with planned Out of  Hospital Birth is transferring by car to the hospital for thick Meconium Stained fluid at 42 weeks.  Had Reactive NST 10/5 and then Took Castor oil x1. Had initially clear fluid that was then noted thick meconium.    Patient has been seen by [facility] for prenatal care.  Transferring midwife name(s),/birth center & phone number: [facility]  [phone]  Midwife here supporting patient: No  Records received, reviewed and scanned into chart.          Positive GBS test 10/06/2020     Priority: Medium     Past Medical History:   Diagnosis Date     Hx of maternal cervical laceration, currently pregnant     [facility] C/S 10/2020 with uterine extension into the left cervix. Recommend cervical length screening for future pregnancy       Medications:   Current Outpatient Medications:      fish oil-omega-3 fatty acids 1000 MG capsule, Take 2 g by mouth., Disp: , Rfl:      Magnesium Citrate (CITROMA PO), , Disp: , Rfl:      Prenatal Vit-Fe Fumarate-FA (PRENATAL PLUS) 27-1 MG TABS, Take 1 tablet by mouth daily., Disp: , Rfl:      senna-docusate (SENOKOT-S/PERICOLACE) 8.6-50 MG tablet, Take 1 tablet by mouth 2 times daily, Disp: 60 tablet, Rfl: 0    Allergies: No Known Allergies    Social history:   Social History     Socioeconomic History     Marital status:      Spouse name: Not on file     Number of children: Not on file     Years of education: Not on file     Highest education level: Not on file   Occupational History     Not on file   Tobacco Use     Smoking status: Never     Smokeless tobacco: Never   Substance and Sexual Activity     Alcohol use: Not Currently     Drug use: Not Currently     Sexual activity: Yes     Partners: Male   Other Topics Concern     Not on file   Social History Narrative     Not on file     Social Drivers of Health     Financial Resource Strain: Low Risk  (12/29/2023)    Received from [facility]    Overall Financial Resource Strain (CARDIA)       "Difficulty of Paying Living Expenses: Not hard at all   Food Insecurity: No Food Insecurity (12/29/2023)    Received from [facility]    Hunger Vital Sign      Worried About Running Out of Food in the Last Year: Never true      Ran Out of Food in the Last Year: Never true   Transportation Needs: No Transportation Needs (12/29/2023)    Received from [facility]    PRAPARE - Transportation      Lack of Transportation (Medical): No      Lack of Transportation (Non-Medical): No   Physical Activity: Not on file   Stress: Not on file   Social Connections: Not on file   Interpersonal Safety: Not At Risk (2/20/2024)    Received from [facility]    Humiliation, Afraid, Rape, and Kick questionnaire      Fear of Current or Ex-Partner: No      Emotionally Abused: No      Physically Abused: No      Sexually Abused: No   Housing Stability: Low Risk  (12/29/2023)    Received from [facility]      Retired - What is your living situation today? : I have a steady place to live     Physical exam:  Vitals:Ht 1.549 m (5' 1\")   Wt 67.6 kg (149 lb)   BMI 28.15 kg/m    Gen: NAD, pleasant, interactive and answering questions appropriately, PS 0  HEENT: Normocephalic atraumatic, sclera anicteric  Neck: Full range of motion  Lungs: No respiratory distress, speaking in full sentences, no coughing  Skin: no facial rash noted  Psych: normal affect    Labs:      Radiology: Reviewed in EPIC    Assessment/plan:   Geraldine Neves is a 34 year old female with a recently diagnosed left clinical T1c(m) vs T2N0 hormone receptor positive, HER2 positive breast cancer.    1.  Breast Cancer: We reviewed her MRI and PET/CT which did not show any lexi or metastatic disease. Based on imaging there are 2 adjuvant masses each <2cm. She does wish to proceed with genetic testing and this has " been ordered.  I recommend neoadjuvant therapy given the 2 tumors and their proximity. She is a candidate for ISPY2.2 but after consideration, she does not wish to participate.  We reviewed rationale of neoadjuvant therapy with TCH-P and the ability to adjust adjuvant therapy based on pathologic response.  If she opts for surgery first, if confirmed stage I, then would plan for Taxol/Herceptin.  If T2 or higher stage, then recommend adjuvant TCH-P followed by HP. She isn't sure she want to proceed with chemotherapy but we agreed to reassess postoperatively. I will update Dr. Simpson for surgical planning.    2.  Incomplete RBBB: we reviewed this finding on EKG. Advised she follow up with PCP.    All of the patient's questions were answered.    Return to the office 2 weeks postoperatively.    Thank you for allowing me to participate in the care of your patient.  Please call with any questions.    I personally reviewed the recent studies and I explained the rationale of the tests ordered today to the patient.     The longitudinal plan of care for the diagnosis(es)/condition(s) as documented were addressed during this visit. Due to the added complexity in care, I will continue to support Geraldine in the subsequent management and with ongoing continuity of care.      No orders of the defined types were placed in this encounter.      Again, thank you for allowing me to participate in the care of your patient.        Sincerely,        Lucia Linda MD    Electronically signed

## 2025-03-11 ENCOUNTER — LAB (OUTPATIENT)
Dept: LAB | Facility: CLINIC | Age: 35
End: 2025-03-11
Payer: COMMERCIAL

## 2025-03-11 DIAGNOSIS — C50.912 MALIGNANT NEOPLASM OF LEFT BREAST IN FEMALE, ESTROGEN RECEPTOR POSITIVE, UNSPECIFIED SITE OF BREAST (H): ICD-10-CM

## 2025-03-11 DIAGNOSIS — Z17.0 MALIGNANT NEOPLASM OF LEFT BREAST IN FEMALE, ESTROGEN RECEPTOR POSITIVE, UNSPECIFIED SITE OF BREAST (H): ICD-10-CM

## 2025-03-12 ENCOUNTER — LAB (OUTPATIENT)
Dept: LAB | Facility: CLINIC | Age: 35
End: 2025-03-12
Payer: COMMERCIAL

## 2025-03-12 DIAGNOSIS — C50.912 MALIGNANT NEOPLASM OF LEFT BREAST IN FEMALE, ESTROGEN RECEPTOR POSITIVE, UNSPECIFIED SITE OF BREAST (H): Primary | ICD-10-CM

## 2025-03-12 DIAGNOSIS — Z17.0 MALIGNANT NEOPLASM OF LEFT BREAST IN FEMALE, ESTROGEN RECEPTOR POSITIVE, UNSPECIFIED SITE OF BREAST (H): Primary | ICD-10-CM

## 2025-03-12 PROCEDURE — 81162 BRCA1&2 GEN FULL SEQ DUP/DEL: CPT | Performed by: INTERNAL MEDICINE

## 2025-03-12 PROCEDURE — G0452 MOLECULAR PATHOLOGY INTERPR: HCPCS | Mod: 26 | Performed by: STUDENT IN AN ORGANIZED HEALTH CARE EDUCATION/TRAINING PROGRAM

## 2025-03-13 ENCOUNTER — VIRTUAL VISIT (OUTPATIENT)
Dept: ONCOLOGY | Facility: CLINIC | Age: 35
End: 2025-03-13
Attending: SURGERY
Payer: COMMERCIAL

## 2025-03-13 VITALS — HEIGHT: 61 IN | BODY MASS INDEX: 28.13 KG/M2 | WEIGHT: 149 LBS

## 2025-03-13 DIAGNOSIS — Z17.0 MALIGNANT NEOPLASM OF LEFT BREAST IN FEMALE, ESTROGEN RECEPTOR POSITIVE, UNSPECIFIED SITE OF BREAST (H): Primary | ICD-10-CM

## 2025-03-13 DIAGNOSIS — C50.912 MALIGNANT NEOPLASM OF LEFT BREAST IN FEMALE, ESTROGEN RECEPTOR POSITIVE, UNSPECIFIED SITE OF BREAST (H): Primary | ICD-10-CM

## 2025-03-13 NOTE — NURSING NOTE
Patient confirms medications and allergies are accurate via patients echeck in completion, and or denies any changes since last reviewed/verified.     Current patient location: [address] [place] [id number]    Is the patient currently in the state of [place]? YES    Visit mode: VIDEO    If the visit is dropped, the patient can be reconnected by:VIDEO VISIT: Text to cell phone:   Telephone Information:   Mobile [phone]       Will anyone else be joining the visit? NO  (If patient encounters technical issues they should call [phone]    Are changes needed to the allergy or medication list? No    Are refills needed on medications prescribed by this physician? NO    Rooming Documentation:  Questionnaire(s) not done per department protocol    Reason for visit: RECHECK    She GARCIA

## 2025-03-13 NOTE — PROGRESS NOTES
Today I had a follow-up visit with Geraldine to discuss management of her T2 N0 M0 HER2 positive breast cancer.  Since I have seen her last she met with medical oncology and she declined neoadjuvant chemotherapy.  She had an MRI which demonstrated 2 separate masses that were adjacent to each other.  1 measured 2.0 cm in size the other measured 1.6 cm in size.  The total size of the enhancement was 4.9 cm in size and there were no enlarged lymph nodes.  She also had a PET CT scan which demonstrated 2 adjacent masses with no distant or lexi metastasis.  She has her genetics pending.  She wants to undergo a surgery first approach.  She is interested in breast conserving surgery if her genetics come back negative.  I have talked her about a lumpectomy and a sentinel lymph node biopsy and possible needs for reexcision.  If she has a genetic mutation she is interested in a bilateral mastectomy with or without reconstruction.  She states that both masses are palpable.    Impression: Stage II breast cancer    Plan: Will proceed with a lumpectomy and sentinel lymph node biopsy.  Will schedule this far enough in advance that we have her genetic testing results in case we need to change our plan.    The video started at 7:38 AM and ended at 7:57 AM and the total time of the visit was 20 minutes which included reviewing her most recent imaging.

## 2025-03-13 NOTE — LETTER
3/13/2025      Geraldine Neves  [address]  [facility] [address]      Dear Colleague,    Thank you for referring your patient, Geraldine Neves, to the [facility]. Please see a copy of my visit note below.    Virtual Visit Details        Today I had a follow-up visit with Geraldine to discuss management of her T2 N0 M0 HER2 positive breast cancer.  Since I have seen her last she met with medical oncology and she declined neoadjuvant chemotherapy.  She had an MRI which demonstrated 2 separate masses that were adjacent to each other.  1 measured 2.0 cm in size the other measured 1.6 cm in size.  The total size of the enhancement was 4.9 cm in size and there were no enlarged lymph nodes.  She also had a PET CT scan which demonstrated 2 adjacent masses with no distant or lexi metastasis.  She has her genetics pending.  She wants to undergo a surgery first approach.  She is interested in breast conserving surgery if her genetics come back negative.  I have talked her about a lumpectomy and a sentinel lymph node biopsy and possible needs for reexcision.  If she has a genetic mutation she is interested in a bilateral mastectomy with or without reconstruction.  She states that both masses are palpable.    Impression: Stage II breast cancer    Plan: Will proceed with a lumpectomy and sentinel lymph node biopsy.  Will schedule this far enough in advance that we have her genetic testing results in case we need to change our plan.    The video started at 7:38 AM and ended at 7:57 AM and the total time of the visit was 20 minutes which included reviewing her most recent imaging.      Again, thank you for allowing me to participate in the care of your patient.        Sincerely,        Fuad Simpson MD    Electronically signed

## 2025-03-18 ENCOUNTER — TELEPHONE (OUTPATIENT)
Dept: ONCOLOGY | Facility: CLINIC | Age: 35
End: 2025-03-18
Payer: COMMERCIAL

## 2025-03-18 LAB — INTERPRETATION SERPL IEP-IMP: NORMAL

## 2025-03-18 NOTE — TELEPHONE ENCOUNTER
Called patient to schedule surgery with Dr. Simpson    Spoke with:  patient     Date of Surgery: 4/9/2025    Arrival time Discussed with Patient:  No    Location of surgery: [facility]     Pre-Op H&P: PCP, [facility], 3/26/25, 8:00 AM    Post-Op Appts: 4/28/25, 3:30 PM, [facility]     Imaging:  No      Discussed with patient pre-op RN will call 2-3 days prior to surgery with arrival time and instructions:  Yes     Packet sent out: Yes  via [employer] Message [DATE] 3/18/25      Informed patient that they will need an adult  to bring patient home from surgery: Yes  : spouse       Additional Comments:  Patient inquired if their procedure changes to mastectomy, due to genetic testing results, will the surgery date change? Writer stated that Dr. Simpson will advise if that procedure changes.    Nuclear Medicine to be delivered to OR for surgeon to inject - scheduled by writer.      All patients questions were answered and patient was instructed to review surgical packet and call back with any questions or concerns.       Pj Lowry on 3/18/2025 at 10:32 AM

## 2025-04-01 ENCOUNTER — PATIENT OUTREACH (OUTPATIENT)
Dept: ONCOLOGY | Facility: CLINIC | Age: 35
End: 2025-04-01
Payer: COMMERCIAL

## 2025-04-01 NOTE — PROGRESS NOTES
[facility]: Cancer Care                                                                                          Pt had called and LVM re: scheduling a follow up appt with Dr Linda. Called pt back but she did not answer so LVM. Also requested scheduling to call her to set up the appointment the week of 4/28 or the following week if needed.    Signature:  Iris Deras RNCC

## 2025-04-01 NOTE — PROGRESS NOTES
[facility]: Cancer Care                                                                                          Return patients called re: needing an appointment with Dr Linda around April28th. Could not tell from the message she left if the 28th worked or not. Will send a message to scheduling for when it works for her. LVM for pt.     Signature:  Iris Deras RNCC

## 2025-04-09 ENCOUNTER — ANESTHESIA (OUTPATIENT)
Dept: SURGERY | Facility: CLINIC | Age: 35
End: 2025-04-09
Payer: COMMERCIAL

## 2025-04-09 ENCOUNTER — HOSPITAL ENCOUNTER (OUTPATIENT)
Dept: NUCLEAR MEDICINE | Facility: CLINIC | Age: 35
Setting detail: NUCLEAR MEDICINE
Discharge: HOME | End: 2025-04-09
Attending: SURGERY
Payer: COMMERCIAL

## 2025-04-09 ENCOUNTER — HOSPITAL ENCOUNTER (OUTPATIENT)
Facility: CLINIC | Age: 35
Discharge: HOME | End: 2025-04-09
Attending: SURGERY | Admitting: SURGERY
Payer: COMMERCIAL

## 2025-04-09 ENCOUNTER — ANESTHESIA EVENT (OUTPATIENT)
Dept: SURGERY | Facility: CLINIC | Age: 35
End: 2025-04-09
Payer: COMMERCIAL

## 2025-04-09 ENCOUNTER — ANCILLARY PROCEDURE (OUTPATIENT)
Dept: MAMMOGRAPHY | Facility: CLINIC | Age: 35
End: 2025-04-09
Attending: SURGERY
Payer: COMMERCIAL

## 2025-04-09 VITALS
SYSTOLIC BLOOD PRESSURE: 97 MMHG | HEART RATE: 66 BPM | RESPIRATION RATE: 16 BRPM | HEIGHT: 61 IN | WEIGHT: 142.86 LBS | TEMPERATURE: 98.3 F | DIASTOLIC BLOOD PRESSURE: 68 MMHG | BODY MASS INDEX: 26.97 KG/M2 | OXYGEN SATURATION: 99 %

## 2025-04-09 DIAGNOSIS — C50.912 MALIGNANT NEOPLASM OF LEFT BREAST IN FEMALE, ESTROGEN RECEPTOR POSITIVE, UNSPECIFIED SITE OF BREAST (H): Primary | ICD-10-CM

## 2025-04-09 DIAGNOSIS — C50.912 MALIGNANT NEOPLASM OF LEFT BREAST IN FEMALE, ESTROGEN RECEPTOR POSITIVE, UNSPECIFIED SITE OF BREAST (H): ICD-10-CM

## 2025-04-09 DIAGNOSIS — Z17.0 MALIGNANT NEOPLASM OF LEFT BREAST IN FEMALE, ESTROGEN RECEPTOR POSITIVE, UNSPECIFIED SITE OF BREAST (H): Primary | ICD-10-CM

## 2025-04-09 DIAGNOSIS — Z17.0 MALIGNANT NEOPLASM OF LEFT BREAST IN FEMALE, ESTROGEN RECEPTOR POSITIVE, UNSPECIFIED SITE OF BREAST (H): ICD-10-CM

## 2025-04-09 PROCEDURE — 258N000003 HC RX IP 258 OP 636: Performed by: NURSE ANESTHETIST, CERTIFIED REGISTERED

## 2025-04-09 PROCEDURE — 710N000012 HC RECOVERY PHASE 2, PER MINUTE: Performed by: SURGERY

## 2025-04-09 PROCEDURE — 272N000001 HC OR GENERAL SUPPLY STERILE: Performed by: SURGERY

## 2025-04-09 PROCEDURE — 343N000001 HC RX 343 MED OP 636: Performed by: SURGERY

## 2025-04-09 PROCEDURE — 370N000017 HC ANESTHESIA TECHNICAL FEE, PER MIN: Performed by: SURGERY

## 2025-04-09 PROCEDURE — 250N000009 HC RX 250: Performed by: SURGERY

## 2025-04-09 PROCEDURE — 38525 BIOPSY/REMOVAL LYMPH NODES: CPT | Mod: RT | Performed by: SURGERY

## 2025-04-09 PROCEDURE — 710N000010 HC RECOVERY PHASE 1, LEVEL 2, PER MIN: Performed by: SURGERY

## 2025-04-09 PROCEDURE — 250N000011 HC RX IP 250 OP 636: Mod: JZ | Performed by: NURSE ANESTHETIST, CERTIFIED REGISTERED

## 2025-04-09 PROCEDURE — 88307 TISSUE EXAM BY PATHOLOGIST: CPT | Mod: 26 | Performed by: PATHOLOGY

## 2025-04-09 PROCEDURE — 999N000141 HC STATISTIC PRE-PROCEDURE NURSING ASSESSMENT: Performed by: SURGERY

## 2025-04-09 PROCEDURE — 250N000011 HC RX IP 250 OP 636

## 2025-04-09 PROCEDURE — 38900 IO MAP OF SENT LYMPH NODE: CPT | Mod: RT | Performed by: SURGERY

## 2025-04-09 PROCEDURE — 38792 RA TRACER ID OF SENTINL NODE: CPT

## 2025-04-09 PROCEDURE — 250N000011 HC RX IP 250 OP 636: Mod: JZ | Performed by: SURGERY

## 2025-04-09 PROCEDURE — A9520 TC99 TILMANOCEPT DIAG 0.5MCI: HCPCS | Performed by: SURGERY

## 2025-04-09 PROCEDURE — 88360 TUMOR IMMUNOHISTOCHEM/MANUAL: CPT | Mod: TC | Performed by: SURGERY

## 2025-04-09 PROCEDURE — 76098 X-RAY EXAM SURGICAL SPECIMEN: CPT | Performed by: RADIOLOGY

## 2025-04-09 PROCEDURE — 360N000082 HC SURGERY LEVEL 2 W/ FLUORO, PER MIN: Performed by: SURGERY

## 2025-04-09 PROCEDURE — 250N000009 HC RX 250: Performed by: NURSE ANESTHETIST, CERTIFIED REGISTERED

## 2025-04-09 PROCEDURE — 19301 PARTIAL MASTECTOMY: CPT | Mod: RT | Performed by: SURGERY

## 2025-04-09 PROCEDURE — 250N000025 HC SEVOFLURANE, PER MIN: Performed by: SURGERY

## 2025-04-09 RX ORDER — CEFAZOLIN SODIUM/WATER 2 G/20 ML
2 SYRINGE (ML) INTRAVENOUS
Status: COMPLETED | OUTPATIENT
Start: 2025-04-09 | End: 2025-04-09

## 2025-04-09 RX ORDER — ONDANSETRON 2 MG/ML
4 INJECTION INTRAMUSCULAR; INTRAVENOUS EVERY 30 MIN PRN
Status: DISCONTINUED | OUTPATIENT
Start: 2025-04-09 | End: 2025-04-09 | Stop reason: HOSPADM

## 2025-04-09 RX ORDER — CEFAZOLIN SODIUM/WATER 2 G/20 ML
2 SYRINGE (ML) INTRAVENOUS SEE ADMIN INSTRUCTIONS
Status: DISCONTINUED | OUTPATIENT
Start: 2025-04-09 | End: 2025-04-09 | Stop reason: HOSPADM

## 2025-04-09 RX ORDER — OXYCODONE HYDROCHLORIDE 10 MG/1
10 TABLET ORAL
Status: DISCONTINUED | OUTPATIENT
Start: 2025-04-09 | End: 2025-04-09 | Stop reason: HOSPADM

## 2025-04-09 RX ORDER — HYDROMORPHONE HCL IN WATER/PF 6 MG/30 ML
0.2 PATIENT CONTROLLED ANALGESIA SYRINGE INTRAVENOUS EVERY 5 MIN PRN
Status: DISCONTINUED | OUTPATIENT
Start: 2025-04-09 | End: 2025-04-09 | Stop reason: HOSPADM

## 2025-04-09 RX ORDER — PROPOFOL 10 MG/ML
INJECTION, EMULSION INTRAVENOUS PRN
Status: DISCONTINUED | OUTPATIENT
Start: 2025-04-09 | End: 2025-04-09

## 2025-04-09 RX ORDER — KETOROLAC TROMETHAMINE 30 MG/ML
INJECTION, SOLUTION INTRAMUSCULAR; INTRAVENOUS PRN
Status: DISCONTINUED | OUTPATIENT
Start: 2025-04-09 | End: 2025-04-09

## 2025-04-09 RX ORDER — DEXAMETHASONE SODIUM PHOSPHATE 4 MG/ML
4 INJECTION, SOLUTION INTRA-ARTICULAR; INTRALESIONAL; INTRAMUSCULAR; INTRAVENOUS; SOFT TISSUE
Status: DISCONTINUED | OUTPATIENT
Start: 2025-04-09 | End: 2025-04-09 | Stop reason: HOSPADM

## 2025-04-09 RX ORDER — NALOXONE HYDROCHLORIDE 0.4 MG/ML
0.1 INJECTION, SOLUTION INTRAMUSCULAR; INTRAVENOUS; SUBCUTANEOUS
Status: DISCONTINUED | OUTPATIENT
Start: 2025-04-09 | End: 2025-04-09 | Stop reason: HOSPADM

## 2025-04-09 RX ORDER — ACETAMINOPHEN 325 MG/1
650 TABLET ORAL EVERY 4 HOURS PRN
Qty: 50 TABLET | Refills: 0 | Status: SHIPPED | OUTPATIENT
Start: 2025-04-09

## 2025-04-09 RX ORDER — HYDROMORPHONE HCL IN WATER/PF 6 MG/30 ML
0.4 PATIENT CONTROLLED ANALGESIA SYRINGE INTRAVENOUS EVERY 5 MIN PRN
Status: DISCONTINUED | OUTPATIENT
Start: 2025-04-09 | End: 2025-04-09 | Stop reason: HOSPADM

## 2025-04-09 RX ORDER — ONDANSETRON 4 MG/1
4 TABLET, ORALLY DISINTEGRATING ORAL EVERY 30 MIN PRN
Status: DISCONTINUED | OUTPATIENT
Start: 2025-04-09 | End: 2025-04-09 | Stop reason: HOSPADM

## 2025-04-09 RX ORDER — FENTANYL CITRATE 50 UG/ML
INJECTION, SOLUTION INTRAMUSCULAR; INTRAVENOUS PRN
Status: DISCONTINUED | OUTPATIENT
Start: 2025-04-09 | End: 2025-04-09

## 2025-04-09 RX ORDER — OXYCODONE HYDROCHLORIDE 5 MG/1
5 TABLET ORAL
Status: DISCONTINUED | OUTPATIENT
Start: 2025-04-09 | End: 2025-04-09 | Stop reason: HOSPADM

## 2025-04-09 RX ORDER — INDOCYANINE GREEN AND WATER 25 MG
KIT INJECTION PRN
Status: DISCONTINUED | OUTPATIENT
Start: 2025-04-09 | End: 2025-04-09 | Stop reason: HOSPADM

## 2025-04-09 RX ORDER — FENTANYL CITRATE 50 UG/ML
50 INJECTION, SOLUTION INTRAMUSCULAR; INTRAVENOUS EVERY 5 MIN PRN
Status: DISCONTINUED | OUTPATIENT
Start: 2025-04-09 | End: 2025-04-09 | Stop reason: HOSPADM

## 2025-04-09 RX ORDER — PROPOFOL 10 MG/ML
INJECTION, EMULSION INTRAVENOUS CONTINUOUS PRN
Status: DISCONTINUED | OUTPATIENT
Start: 2025-04-09 | End: 2025-04-09

## 2025-04-09 RX ORDER — DEXAMETHASONE SODIUM PHOSPHATE 4 MG/ML
INJECTION, SOLUTION INTRA-ARTICULAR; INTRALESIONAL; INTRAMUSCULAR; INTRAVENOUS; SOFT TISSUE PRN
Status: DISCONTINUED | OUTPATIENT
Start: 2025-04-09 | End: 2025-04-09

## 2025-04-09 RX ORDER — FENTANYL CITRATE 50 UG/ML
25 INJECTION, SOLUTION INTRAMUSCULAR; INTRAVENOUS EVERY 5 MIN PRN
Status: DISCONTINUED | OUTPATIENT
Start: 2025-04-09 | End: 2025-04-09 | Stop reason: HOSPADM

## 2025-04-09 RX ORDER — ONDANSETRON 2 MG/ML
INJECTION INTRAMUSCULAR; INTRAVENOUS PRN
Status: DISCONTINUED | OUTPATIENT
Start: 2025-04-09 | End: 2025-04-09

## 2025-04-09 RX ORDER — LIDOCAINE HYDROCHLORIDE 20 MG/ML
INJECTION, SOLUTION INFILTRATION; PERINEURAL PRN
Status: DISCONTINUED | OUTPATIENT
Start: 2025-04-09 | End: 2025-04-09

## 2025-04-09 RX ORDER — SODIUM CHLORIDE, SODIUM LACTATE, POTASSIUM CHLORIDE, CALCIUM CHLORIDE 600; 310; 30; 20 MG/100ML; MG/100ML; MG/100ML; MG/100ML
INJECTION, SOLUTION INTRAVENOUS CONTINUOUS PRN
Status: DISCONTINUED | OUTPATIENT
Start: 2025-04-09 | End: 2025-04-09

## 2025-04-09 RX ORDER — SODIUM CHLORIDE, SODIUM LACTATE, POTASSIUM CHLORIDE, CALCIUM CHLORIDE 600; 310; 30; 20 MG/100ML; MG/100ML; MG/100ML; MG/100ML
INJECTION, SOLUTION INTRAVENOUS CONTINUOUS
Status: DISCONTINUED | OUTPATIENT
Start: 2025-04-09 | End: 2025-04-09 | Stop reason: HOSPADM

## 2025-04-09 RX ADMIN — PROPOFOL 50 MG: 10 INJECTION, EMULSION INTRAVENOUS at 08:52

## 2025-04-09 RX ADMIN — KETOROLAC TROMETHAMINE 30 MG: 30 INJECTION, SOLUTION INTRAMUSCULAR at 09:05

## 2025-04-09 RX ADMIN — ONDANSETRON 4 MG: 2 INJECTION INTRAMUSCULAR; INTRAVENOUS at 08:28

## 2025-04-09 RX ADMIN — PROPOFOL 20 MG: 10 INJECTION, EMULSION INTRAVENOUS at 07:56

## 2025-04-09 RX ADMIN — FENTANYL CITRATE 50 MCG: 50 INJECTION INTRAMUSCULAR; INTRAVENOUS at 07:35

## 2025-04-09 RX ADMIN — PROPOFOL 150 MG: 10 INJECTION, EMULSION INTRAVENOUS at 07:33

## 2025-04-09 RX ADMIN — PHENYLEPHRINE HYDROCHLORIDE 100 MCG: 10 INJECTION INTRAVENOUS at 07:45

## 2025-04-09 RX ADMIN — LIDOCAINE HYDROCHLORIDE 60 MG: 20 INJECTION, SOLUTION INFILTRATION; PERINEURAL at 07:33

## 2025-04-09 RX ADMIN — PHENYLEPHRINE HYDROCHLORIDE 50 MCG: 10 INJECTION INTRAVENOUS at 07:47

## 2025-04-09 RX ADMIN — PHENYLEPHRINE HYDROCHLORIDE 100 MCG: 10 INJECTION INTRAVENOUS at 08:00

## 2025-04-09 RX ADMIN — FENTANYL CITRATE 50 MCG: 50 INJECTION INTRAMUSCULAR; INTRAVENOUS at 08:49

## 2025-04-09 RX ADMIN — SODIUM CHLORIDE, SODIUM LACTATE, POTASSIUM CHLORIDE, AND CALCIUM CHLORIDE: .6; .31; .03; .02 INJECTION, SOLUTION INTRAVENOUS at 07:26

## 2025-04-09 RX ADMIN — DEXAMETHASONE SODIUM PHOSPHATE 4 MG: 4 INJECTION, SOLUTION INTRA-ARTICULAR; INTRALESIONAL; INTRAMUSCULAR; INTRAVENOUS; SOFT TISSUE at 07:45

## 2025-04-09 RX ADMIN — FENTANYL CITRATE 50 MCG: 50 INJECTION INTRAMUSCULAR; INTRAVENOUS at 08:03

## 2025-04-09 RX ADMIN — PROPOFOL 50 MG: 10 INJECTION, EMULSION INTRAVENOUS at 07:35

## 2025-04-09 RX ADMIN — FENTANYL CITRATE 50 MCG: 50 INJECTION INTRAMUSCULAR; INTRAVENOUS at 07:32

## 2025-04-09 RX ADMIN — PROPOFOL 150 MCG/KG/MIN: 10 INJECTION, EMULSION INTRAVENOUS at 07:37

## 2025-04-09 RX ADMIN — MIDAZOLAM 2 MG: 1 INJECTION INTRAMUSCULAR; INTRAVENOUS at 07:26

## 2025-04-09 RX ADMIN — DEXMEDETOMIDINE HYDROCHLORIDE 8 MCG: 100 INJECTION, SOLUTION INTRAVENOUS at 08:53

## 2025-04-09 RX ADMIN — Medication 2 G: at 07:48

## 2025-04-09 ASSESSMENT — COPD QUESTIONNAIRES: COPD: 0

## 2025-04-09 ASSESSMENT — ACTIVITIES OF DAILY LIVING (ADL)
ADLS_ACUITY_SCORE: 36

## 2025-04-09 ASSESSMENT — ENCOUNTER SYMPTOMS: SEIZURES: 0

## 2025-04-09 NOTE — OP NOTE
Preoperative Diagnosis: Left breast cancer    Postoperative Diagnosis: Same    Procedure: Left lumpectomy, lymphatic mapping, left axillary sentinel node biopsy x 2    Surgeons: Dr. Fuad Simpson and Dr. Mckeon  Anesthesia: General    Indications for Surgery: Patient is a 34-year-old woman who presents with a T2 N0 M0 left triple negative breast cancer.  Neoadjuvant chemotherapy was recommended but the patient declined.  She has 2 palpable tumors in the same area.    Procedure in Detail: After informed consent the patient was brought the operating given a general anesthetic.  I injected technetium sulfur colloid and ICG into her left breast.  She was prepped and draped in the usual fashion.  I administered a local anesthetic to the superior aspect of her left breast.  A curvilinear incision was made over both palpable masses.  The Bovie cautery was used to incise the subcutaneous tissues.  I dissected circumferentially around the 2 masses to ensure adequate surgical margins.  The dissection went down to the underlying fascia which was excised.  The specimen was removed oriented and a specimen radiograph was obtained.  The specimen radiograph demonstrated complete excision of both lesions.  Strict hemostasis was obtained with the Bovie cautery.  Through the same incision I was able to identify a radioactive hotspot beneath the clavipectoral fascia.  The clavipectoral fascia was incised and 2 radioactive and fluorescent lymph nodes were identified.  [place] node #1 was removed and had ex vivo counts of 600 counts per second.  [place] node #2 was removed and had ex vivo counts of 50 counts per second.  After removal of the second sentinel lymph node there were no additional radioactive fluorescent or palpable lymph nodes.  Surgical clips were placed in all 4 quadrants the resection bed.  The dermis was closed with interrupted 3-0 Vicryl suture and the skin was closed a running 4-0 PDS subcuticular stitch.  Dermabond  was placed and the patient was taken to the recovery room in stable condition.      [place] Node Biopsy for Breast Cancer - Left  Operation performed with curative intent Yes   Tracer(s) used to identify sentinel nodes in the upfront surgery (non-neoadjuvant) setting Dye   Tracer(s) used to identify sentinel nodes in the neoadjuvant setting Radioactive tracer   All nodes (colored or non-colored) present at the end of a dye-filled lymphatic channel were removed Yes   All significantly radioactive nodes were removed Yes   All palpably suspicious nodes were removed N/A   Biopsy-proven positive nodes marked with clips prior to chemotherapy were identified and removed N/A     This procedure was not performed to treat breast cancer through axillary lymph node dissection      Fuad Simpson MD, MS  Surgical Oncology

## 2025-04-09 NOTE — DISCHARGE INSTRUCTIONS
Contacting your Doctor -   To contact a doctor, call Dr Simpson's office at [phone] ([facility] clinic)   or:  [phone] and ask for the resident on call for Surgery (answered 24 hours a day)   Emergency Department:  [facility]: [phone]  [phone] if you are in need of immediate or emergent help

## 2025-04-09 NOTE — ANESTHESIA POSTPROCEDURE EVALUATION
Patient: Geraldine Neves    Procedure: Procedure(s):  LUMPECTOMY, BREAST, WITH SENTINEL LYMPH NODE BIOPSY       Anesthesia Type:  General    Note:  Disposition: Outpatient   Postop Pain Control: Uneventful            Sign Out: Well controlled pain   PONV: No   Neuro/Psych: Uneventful            Sign Out: Acceptable/Baseline neuro status   Airway/Respiratory: Uneventful            Sign Out: Acceptable/Baseline resp. status   CV/Hemodynamics: Uneventful            Sign Out: Acceptable CV status; No obvious hypovolemia; No obvious fluid overload   Other NRE: NONE   DID A NON-ROUTINE EVENT OCCUR? No           Last vitals:  Vitals Value Taken Time   BP 94/51 04/09/25 1000   Temp     Pulse 78 04/09/25 1004   Resp 16 04/09/25 1004   SpO2 100 % 04/09/25 1004   Vitals shown include unfiled device data.    Electronically Signed By: REYNA ESPINO MD  April 9, 2025  10:04 AM

## 2025-04-09 NOTE — BRIEF OP NOTE
[facility]    Brief Operative Note    Pre-operative diagnosis: Malignant neoplasm of left breast in female, estrogen receptor positive, unspecified site of breast (H) [C50.912, Z17.0]  Post-operative diagnosis Same as pre-operative diagnosis    Procedure: LUMPECTOMY, BREAST, WITH SENTINEL LYMPH NODE BIOPSY, Left - Breast    Surgeon: Surgeons and Role:     * Fuad Simpson MD - Primary     * Carlota Mckeon MD - Resident - Assisting     * Jose Sahu MD - Resident - Observing  Anesthesia: General   Estimated Blood Loss: Minimal    Drains: None  Specimens:   ID Type Source Tests Collected by Time Destination   1 : Left Lumpectomy Lumpectomy Breast, Left SURGICAL PATHOLOGY EXAM Fuad Simpson MD 4/9/2025  8:35 AM    2 : Left Axillary [place] Lymph Node 1 Tissue Lymph Node(s), Axillary, Left SURGICAL PATHOLOGY EXAM Fuad Simpson MD 4/9/2025  8:53 AM    3 : Left Axillary [place] Lymph Node 2 Tissue Lymph Node(s), Axillary, Left SURGICAL PATHOLOGY EXAM Fuad Simpson MD 4/9/2025  9:00 AM      Findings:   2 masses removed and confirmed with intra-operative X-ray. 2 sentinenl nodes identified with radiotracer and I-spy and removed .  Complications: None.  Implants: * No implants in log *    Post-op plan:  - Regular diet as tolerated  - Multimodal pain control, no narcotics on discharge  - No lifting >20lb x2 weeks  - Return to clinic with Dr. Simpson in 2 weeks post-op, follow-up with medical oncologist

## 2025-04-09 NOTE — ANESTHESIA CARE TRANSFER NOTE
Patient: Geraldine Neves    Procedure: Procedure(s):  LUMPECTOMY, BREAST, WITH SENTINEL LYMPH NODE BIOPSY       Diagnosis: Malignant neoplasm of left breast in female, estrogen receptor positive, unspecified site of breast (H) [C50.912, Z17.0]  Diagnosis Additional Information: No value filed.    Anesthesia Type:   General     Note:    Oropharynx: oropharynx clear of all foreign objects and spontaneously breathing  Level of Consciousness: drowsy  Oxygen Supplementation: nasal cannula  Level of Supplemental Oxygen (L/min / FiO2): 3  Independent Airway: airway patency satisfactory and stable  Dentition: dentition unchanged  Vital Signs Stable: post-procedure vital signs reviewed and stable  Report to RN Given: handoff report given  Patient transferred to: PACU    Handoff Report: Identifed the Patient, Identified the Reponsible Provider, Reviewed the pertinent medical history, Discussed the surgical course, Reviewed Intra-OP anesthesia mangement and issues during anesthesia, Set expectations for post-procedure period and Allowed opportunity for questions and acknowledgement of understanding      Vitals:  Vitals Value Taken Time   BP 95/55 04/09/25 0937   Temp     Pulse 76 04/09/25 0937   Resp 16 04/09/25 0937   SpO2 100 % 04/09/25 0937   Vitals shown include unfiled device data.    Electronically Signed By: ALEX Edgar CRNA  April 9, 2025  9:37 AM

## 2025-04-09 NOTE — ANESTHESIA PROCEDURE NOTES
Airway       Patient location during procedure: OR       Procedure Start/Stop Times: 4/9/2025 7:35 AM  Staff -        Anesthesiologist:  Tarik Santiago MD       CRNA: Anita Mitchell APRN CRNA       Other Anesthesia Staff: Carmela Simmons       Performed By: OSMAN and with CRNAs       Procedure performed by resident/fellow/CRNA in presence of a teaching physician.    Consent for Airway        Urgency: elective  Indications and Patient Condition       Indications for airway management: adelina-procedural         Mask difficulty assessment: 1 - vent by mask    Final Airway Details       Final airway type: supraglottic airway    Supraglottic Airway Details        Type: LMA       Brand: I-Gel       LMA size: 4    Post intubation assessment        Placement verified by: capnometry and chest rise        Number of attempts at approach: 1       Secured with: tape       Ease of procedure: easy       Dentition: Intact and Unchanged    Medication(s) Administered   Medication Administration Time: 4/9/2025 7:35 AM

## 2025-04-09 NOTE — ANESTHESIA PREPROCEDURE EVALUATION
Anesthesia Pre-Procedure Evaluation    Patient: Geraldine Neves   MRN: [id number] : 1990        Procedure : Procedure(s):  LUMPECTOMY, BREAST, WITH SENTINEL LYMPH NODE BIOPSY          Past Medical History:   Diagnosis Date    Hx of maternal cervical laceration, currently pregnant     [facility] C/S 10/2020 with uterine extension into the left cervix. Recommend cervical length screening for future pregnancy      Past Surgical History:   Procedure Laterality Date     SECTION N/A 10/7/2020    Procedure:  SECTION;  Surgeon: Lucero Lopez MD;  Location: [facility] L[facility]    CYSTOSCOPY N/A 10/7/2020    Procedure: Cystoscopy;  Surgeon: Lucero Lopez MD;  Location: [facility]      No Known Allergies   Social History     Tobacco Use    Smoking status: Never    Smokeless tobacco: Never   Substance Use Topics    Alcohol use: Not Currently      Wt Readings from Last 1 Encounters:   25 64.8 kg (142 lb 13.7 oz)        Anesthesia Evaluation   Pt has had prior anesthetic.     No history of anesthetic complications       ROS/MED HX  ENT/Pulmonary:  - neg pulmonary ROS  (-) asthma, COPD and sleep apnea   Neurologic:  - neg neurologic ROS  (-) no seizures and no CVA   Cardiovascular:  - neg cardiovascular ROS  (-) murmur   METS/Exercise Tolerance: >4 METS    Hematologic:  - neg hematologic  ROS  (-) history of blood clots   Musculoskeletal:       GI/Hepatic:  - neg GI/hepatic ROS  (-) GERD   Renal/Genitourinary:  - neg Renal ROS     Endo:  - neg endo ROS  (-) Type II DM   Psychiatric/Substance Use:  - neg psychiatric ROS     Infectious Disease:       Malignancy:   (+) Malignancy, History of Breast.    Other:            Physical Exam    Airway        Mallampati: I   TM distance: > 3 FB   Neck ROM: full     Respiratory Devices and Support         Dental       (+) Completely normal teeth      Cardiovascular          Rhythm and rate: regular and normal (-) no murmur    Pulmonary           breath sounds  clear to auscultation           OUTSIDE LABS:  CBC:   Lab Results   Component Value Date    WBC 5.8 03/06/2025    WBC 12.5 (H) 06/19/2023    HGB 14.0 03/06/2025    HGB 13.2 06/19/2023    HCT 43.5 03/06/2025    HCT 40.0 06/19/2023     03/06/2025     06/19/2023     BMP:   Lab Results   Component Value Date     03/06/2025     10/08/2020    POTASSIUM 4.1 03/06/2025    POTASSIUM 4.3 10/08/2020    CHLORIDE 106 03/06/2025    CHLORIDE 111 (H) 10/08/2020    CO2 25 03/06/2025    CO2 25 10/08/2020    BUN 10.3 03/06/2025    BUN 8 10/08/2020    CR 0.74 03/06/2025    CR 0.64 10/08/2020    GLC 91 03/06/2025    GLC 79 10/08/2020     COAGS:   Lab Results   Component Value Date    PTT 28 03/06/2025    INR 1.03 03/06/2025     POC:   Lab Results   Component Value Date    HCG Negative 03/06/2025     HEPATIC:   Lab Results   Component Value Date    ALBUMIN 4.5 03/06/2025    PROTTOTAL 7.5 03/06/2025    ALT 16 03/06/2025    AST 17 03/06/2025    ALKPHOS 64 03/06/2025    BILITOTAL 0.3 03/06/2025     OTHER:   Lab Results   Component Value Date    A1C 5.3 03/06/2025    NADIA 9.5 03/06/2025    MAG 2.2 03/06/2025    TSH 2.21 03/06/2025    T4 1.25 03/06/2025       Anesthesia Plan    ASA Status:  3    NPO Status:  NPO Appropriate    Anesthesia Type: General.     - Airway: LMA   Induction: Intravenous, Propofol.   Maintenance: Balanced.        Consents    Anesthesia Plan(s) and associated risks, benefits, and realistic alternatives discussed. Questions answered and patient/representative(s) expressed understanding.     - Discussed: Risks, Benefits and Alternatives for BOTH SEDATION and the PROCEDURE were discussed     - Discussed with:  Patient      - Extended Intubation/Ventilatory Support Discussed: No.      - Patient is DNR/DNI Status: No     Use of blood products discussed: Yes.     - Discussed with: Patient.     Postoperative Care    Pain management: Oral pain medications, IV analgesics.   PONV prophylaxis: Ondansetron  "(or other 5HT-3), Dexamethasone or Solumedrol     Comments:               Tarik Santiago MD    Clinically Significant Risk Factors Present on Admission                             # Overweight: Estimated body mass index is 26.99 kg/m  as calculated from the following:    Height as of this encounter: 1.549 m (5' 1\").    Weight as of this encounter: 64.8 kg (142 lb 13.7 oz).                "

## 2025-04-24 LAB
PATH REPORT.COMMENTS IMP SPEC: ABNORMAL
PATH REPORT.COMMENTS IMP SPEC: YES
PATH REPORT.FINAL DX SPEC: ABNORMAL
PATH REPORT.GROSS SPEC: ABNORMAL
PATH REPORT.MICROSCOPIC SPEC OTHER STN: ABNORMAL
PATH REPORT.RELEVANT HX SPEC: ABNORMAL
PATHOLOGY SYNOPTIC REPORT: ABNORMAL
PHOTO IMAGE: ABNORMAL

## 2025-04-24 PROCEDURE — 88360 TUMOR IMMUNOHISTOCHEM/MANUAL: CPT | Mod: 26 | Performed by: PATHOLOGY

## 2025-04-24 PROCEDURE — 88342 IMHCHEM/IMCYTCHM 1ST ANTB: CPT | Mod: 26 | Performed by: PATHOLOGY

## 2025-04-28 ENCOUNTER — VIRTUAL VISIT (OUTPATIENT)
Dept: ONCOLOGY | Facility: CLINIC | Age: 35
End: 2025-04-28
Attending: SURGERY
Payer: COMMERCIAL

## 2025-04-28 DIAGNOSIS — Z17.0 MALIGNANT NEOPLASM OF LEFT BREAST IN FEMALE, ESTROGEN RECEPTOR POSITIVE, UNSPECIFIED SITE OF BREAST (H): Primary | ICD-10-CM

## 2025-04-28 DIAGNOSIS — M25.612 DECREASED ROM OF LEFT SHOULDER: ICD-10-CM

## 2025-04-28 DIAGNOSIS — C50.912 MALIGNANT NEOPLASM OF LEFT BREAST IN FEMALE, ESTROGEN RECEPTOR POSITIVE, UNSPECIFIED SITE OF BREAST (H): Primary | ICD-10-CM

## 2025-04-28 NOTE — LETTER
4/28/2025      Geraldine Neves  [address]  [place] [address]      Dear Colleague,    Thank you for referring your patient, Geraldine Neves, to the [facility]. Please see a copy of my visit note below.    Virtual Visit Details    Today I had a video visit with Geraldine to discuss results of her surgery.  On April 9 she underwent a lumpectomy and a sentinel lymph node biopsy for a preoperative T2 N0 M0 ER positive HER2 positive breast cancer.  She declined neoadjuvant chemotherapy.  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  The tumor was 44 mm in size with extensive lymphovascular invasion.  The margins were negative.  Her sentinel node was also positive.  She is recovering well from surgery except for some stiffness in her chest.  She has an appointment next week to discuss the surgical results with her medical oncologist.  I will see her in the future if any problems arise.      Again, thank you for allowing me to participate in the care of your patient.        Sincerely,        Fuad Simpson MD    Electronically signed

## 2025-04-28 NOTE — PROGRESS NOTES
Today I had a video visit with Geraldine to discuss results of her surgery.  On April 9 she underwent a lumpectomy and a sentinel lymph node biopsy for a preoperative T2 N0 M0 ER positive HER2 positive breast cancer.  She declined neoadjuvant chemotherapy.  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  The tumor was 44 mm in size with extensive lymphovascular invasion.  The margins were negative.  Her sentinel node was also positive.  She is recovering well from surgery except for some stiffness in her chest.  She has an appointment next week to discuss the surgical results with her medical oncologist.  I will see her in the future if any problems arise.

## 2025-04-28 NOTE — NURSING NOTE
Current patient location: [address] [place] [id number]    Is the patient currently in the state of [place]? YES    Visit mode: VIDEO    If the visit is dropped, the patient can be reconnected by:VIDEO VISIT: Text to cell phone:   Telephone Information:   Mobile [phone]       Will anyone else be joining the visit? NO  (If patient encounters technical issues they should call [phone]    Are changes needed to the allergy or medication list? Pt stated no changes to allergies and Pt stated no med changes    Are refills needed on medications prescribed by this physician? NO    Rooming Documentation:  Questionnaire(s) completed    Reason for visit: RECHECK    Nikolas GARCIA

## 2025-05-05 ENCOUNTER — VIRTUAL VISIT (OUTPATIENT)
Dept: ONCOLOGY | Facility: CLINIC | Age: 35
End: 2025-05-05
Attending: INTERNAL MEDICINE
Payer: COMMERCIAL

## 2025-05-05 VITALS — HEIGHT: 61 IN | BODY MASS INDEX: 25.68 KG/M2 | WEIGHT: 136 LBS

## 2025-05-05 DIAGNOSIS — C50.912 MALIGNANT NEOPLASM OF LEFT BREAST IN FEMALE, ESTROGEN RECEPTOR POSITIVE, UNSPECIFIED SITE OF BREAST (H): Primary | ICD-10-CM

## 2025-05-05 DIAGNOSIS — Z17.0 MALIGNANT NEOPLASM OF LEFT BREAST IN FEMALE, ESTROGEN RECEPTOR POSITIVE, UNSPECIFIED SITE OF BREAST (H): Primary | ICD-10-CM

## 2025-05-05 PROCEDURE — G2211 COMPLEX E/M VISIT ADD ON: HCPCS | Mod: 95 | Performed by: INTERNAL MEDICINE

## 2025-05-05 PROCEDURE — 98007 SYNCH AUDIO-VIDEO EST HI 40: CPT | Performed by: INTERNAL MEDICINE

## 2025-05-05 PROCEDURE — 1125F AMNT PAIN NOTED PAIN PRSNT: CPT | Mod: 95 | Performed by: INTERNAL MEDICINE

## 2025-05-05 ASSESSMENT — PAIN SCALES - GENERAL: PAINLEVEL_OUTOF10: MODERATE PAIN (4)

## 2025-05-05 NOTE — LETTER
2025      Geraldine Neves  [address]  [place] [address]      Dear Colleague,    Thank you for referring your patient, Geraldine Neves, to the [facility]. Please see a copy of my visit note below.    Virtual Visit Details    Type of service:  Video Visit     Originating Location (pt. Location): Home  Distant Location (provider location):  On-site  Platform used for Video Visit: [facility]    Oncology progress note    Name: Geraldine Neves  : 1990  MRN: [id number]    CC: Breast Cancer    HPI: Geraldine Neves is a 34 year old female with a left [id number] estrogen receptor positive, progesterone receptor negative, HER2 positive breast cancer s/p lumpectomy here for follow up.    She underwent lumpectomy on 25 and pathology revealed a 4.4cm grade 3 invasive pleomorphic lobular carcinoma, with extensive LVI, neg margins, 1/2 node with micrometastatic disease. [id number]. She is healing well from surgery. She asks about rapamycin, chemo sensitivities, radiation and endocrine therapy    She is going to - to [facility] for a wedding    Review of systems: All other systems reviewed and are negative except for what is described in the history of present illness.      PMH:   Patient Active Problem List    Diagnosis Date Noted     Malignant neoplasm of left breast in female, estrogen receptor positive, unspecified site of breast (H) 2025     Priority: Medium     Hx of maternal cervical laceration, currently pregnant      Priority: Medium     [facility] C/S 10/2020 with uterine extension into the left cervix. Recommend cervical length screening for future pregnancy       S/P  section 10/07/2020     Priority: Medium     Meconium in amniotic fluid 10/06/2020     Priority: Medium     Post-term pregnancy, 40-42 weeks of gestation 10/06/2020     Priority: Medium     Pregnancy with prenatal care elsewhere in third trimester 10/06/2020     Priority: Medium      Patient with planned Out of Hospital Birth is transferring by car to the hospital for thick Meconium Stained fluid at 42 weeks.  Had Reactive NST 10/5 and then Took Castor oil x1. Had initially clear fluid that was then noted thick meconium.    Patient has been seen by [facility] for prenatal care.  Transferring midwife name(s),/birth center & phone number: [facility]  [phone]  Midwife here supporting patient: No  Records received, reviewed and scanned into chart.          Positive GBS test 10/06/2020     Priority: Medium     Past Medical History:   Diagnosis Date     Hx of maternal cervical laceration, currently pregnant     [facility] C/S 10/2020 with uterine extension into the left cervix. Recommend cervical length screening for future pregnancy       Medications:   Current Outpatient Medications:      acetaminophen (TYLENOL) 325 MG tablet, Take 2 tablets (650 mg) by mouth every 4 hours as needed for mild pain., Disp: 50 tablet, Rfl: 0     Astragalus 470 MG CAPS, Take 1 capsule by mouth daily., Disp: , Rfl:      fish oil-omega-3 fatty acids 1000 MG capsule, Take 2 g by mouth., Disp: , Rfl:      melatonin 5 MG tablet, Take 20 mg by mouth nightly as needed for sleep., Disp: , Rfl:      Prenatal Vit-Fe Fumarate-FA (PRENATAL PLUS) 27-1 MG TABS, Take 1 tablet by mouth daily. (Patient not taking: Reported on 4/7/2025), Disp: , Rfl:      Probiotic Product (PROBIOTIC BLEND PO), Take by mouth daily., Disp: , Rfl:      TURMERIC PO, Take by mouth daily., Disp: , Rfl:      UNABLE TO FIND, Take 1 tablet by mouth daily. MEDICATION NAME: Turkey Tail Mushroom Supplement, Disp: , Rfl:     Allergies: No Known Allergies    Social history:   Social History     Socioeconomic History     Marital status:      Spouse name: Not on file     Number of children: Not on file     Years of education: Not on file     Highest education level: Not on file   Occupational History     Not on file   Tobacco Use     Smoking status: Never  "    Smokeless tobacco: Never   Substance and Sexual Activity     Alcohol use: Not Currently     Drug use: Not Currently     Sexual activity: Yes     Partners: Male   Other Topics Concern     Not on file   Social History Narrative     Not on file     Social Drivers of Health     Financial Resource Strain: Low Risk  (12/29/2023)    Received from [facility]    Overall Financial Resource Strain (CARDIA)      Difficulty of Paying Living Expenses: Not hard at all   Food Insecurity: No Food Insecurity (12/29/2023)    Received from [facility]    Hunger Vital Sign      Worried About Running Out of Food in the Last Year: Never true      Ran Out of Food in the Last Year: Never true   Transportation Needs: No Transportation Needs (12/29/2023)    Received from [facility]    PRAPARE - Transportation      Lack of Transportation (Medical): No      Lack of Transportation (Non-Medical): No   Physical Activity: Not on file   Stress: Not on file   Social Connections: Not on file   Interpersonal Safety: Low Risk  (4/9/2025)    Interpersonal Safety      Do you feel physically and emotionally safe where you currently live?: Yes      Within the past 12 months, have you been hit, slapped, kicked or otherwise physically hurt by someone?: No      Within the past 12 months, have you been humiliated or emotionally abused in other ways by your partner or ex-partner?: No   Housing Stability: Low Risk  (12/29/2023)    Received from [facility]      Retired - What is your living situation today? : I have a steady place to live     Physical exam:  Vitals:Ht 1.549 m (5' 1\")   Wt 61.7 kg (136 lb)   LMP 03/26/2025 (Exact Date)   BMI 25.70 kg/m    Gen: NAD, pleasant, interactive and answering questions appropriately, PS 0  HEENT: Normocephalic atraumatic, sclera anicteric  Neck: Full " range of motion  Lungs: No respiratory distress, speaking in full sentences, no coughing  Skin: no facial rash noted  Psych: normal affect      Assessment/plan:   Geraldine Neves is a 34 year old female with a left [id number] estrogen receptor positive, progesterone receptor negative, HER2 positive breast cancer s/p lumpectomy here for follow up.    1.  Breast Cancer: we reviewed her final pathology.  Given the fact she had a lumpectomy she will need adjuvant radiation and we discussed the rationale for this.  Given the size and micromet in the node,I recommend adjuvant TCH-P x 6 cycles followed by HP to complete one  year.  We reviewed the APHINITY Trial data.  We discussed lack of data with HER2 based therapy alone without chemotherapy and I do not recommend this.  We discussed the schedule of TCH-P as well as antiemetics and decadron.  I explained we don't use invitro chemo sensitivity assays as it hasn't been shown to translate into clinical benefit in a person.  We discussed the rare but serious side effects of anthracyclines including cardiomyopathy and secondary malignancies. We would work with her vacation dates to start chemotherapy. If she declines chemo, I would do HP alone (acknowledging likely something is better than nothing) but with the clear understanding that there is no data to support.  Given the ER positive nature of the cancer, I also recommend adjuvant endocrine therapy and we briefly discussed tamoxifen vs OFS/AI.  I've asked her to let me know how she wishes to proceed.    All of the patient's questions were answered.    Return to the office pending her treatment decision.    Thank you for allowing me to participate in the care of your patient.  Please call with any questions.    I personally reviewed the recent studies and I explained the rationale of the tests ordered today to the patient.     The longitudinal plan of care for the diagnosis(es)/condition(s) as documented were addressed  during this visit. Due to the added complexity in care, I will continue to support Geraldine in the subsequent management and with ongoing continuity of care.      No orders of the defined types were placed in this encounter.      Again, thank you for allowing me to participate in the care of your patient.        Sincerely,        Lucia Linda MD    Electronically signed

## 2025-05-05 NOTE — PROGRESS NOTES
Virtual Visit Details    Type of service:  Video Visit     Originating Location (pt. Location): Home  Distant Location (provider location):  On-site  Platform used for Video Visit: Yovany

## 2025-05-05 NOTE — PROGRESS NOTES
Oncology progress note    Name: Geraldine Neves  : 1990  MRN: [id number]    CC: Breast Cancer    HPI: Geraldine Neves is a 34 year old female with a left [id number] estrogen receptor positive, progesterone receptor negative, HER2 positive breast cancer s/p lumpectomy here for follow up.    She underwent lumpectomy on 25 and pathology revealed a 4.4cm grade 3 invasive pleomorphic lobular carcinoma, with extensive LVI, neg margins, 1/2 node with micrometastatic disease. [id number]. She is healing well from surgery. She asks about rapamycin, chemo sensitivities, radiation and endocrine therapy    She is going to - to [facility] for a wedding    Review of systems: All other systems reviewed and are negative except for what is described in the history of present illness.      PMH:   Patient Active Problem List    Diagnosis Date Noted    Malignant neoplasm of left breast in female, estrogen receptor positive, unspecified site of breast (H) 2025     Priority: Medium    Hx of maternal cervical laceration, currently pregnant      Priority: Medium     [facility] C/S 10/2020 with uterine extension into the left cervix. Recommend cervical length screening for future pregnancy      S/P  section 10/07/2020     Priority: Medium    Meconium in amniotic fluid 10/06/2020     Priority: Medium    Post-term pregnancy, 40-42 weeks of gestation 10/06/2020     Priority: Medium    Pregnancy with prenatal care elsewhere in third trimester 10/06/2020     Priority: Medium     Patient with planned Out of Hospital Birth is transferring by car to the hospital for thick Meconium Stained fluid at 42 weeks.  Had Reactive NST 10/5 and then Took Castor oil x1. Had initially clear fluid that was then noted thick meconium.    Patient has been seen by [facility] for prenatal care.  Transferring midwife name(s),/birth center & phone number: [facility]  [phone]  Midwife here supporting patient: No  Records received,  reviewed and scanned into chart.         Positive GBS test 10/06/2020     Priority: Medium     Past Medical History:   Diagnosis Date    Hx of maternal cervical laceration, currently pregnant     [facility] C/S 10/2020 with uterine extension into the left cervix. Recommend cervical length screening for future pregnancy       Medications:   Current Outpatient Medications:     acetaminophen (TYLENOL) 325 MG tablet, Take 2 tablets (650 mg) by mouth every 4 hours as needed for mild pain., Disp: 50 tablet, Rfl: 0    Astragalus 470 MG CAPS, Take 1 capsule by mouth daily., Disp: , Rfl:     fish oil-omega-3 fatty acids 1000 MG capsule, Take 2 g by mouth., Disp: , Rfl:     melatonin 5 MG tablet, Take 20 mg by mouth nightly as needed for sleep., Disp: , Rfl:     Prenatal Vit-Fe Fumarate-FA (PRENATAL PLUS) 27-1 MG TABS, Take 1 tablet by mouth daily. (Patient not taking: Reported on 4/7/2025), Disp: , Rfl:     Probiotic Product (PROBIOTIC BLEND PO), Take by mouth daily., Disp: , Rfl:     TURMERIC PO, Take by mouth daily., Disp: , Rfl:     UNABLE TO FIND, Take 1 tablet by mouth daily. MEDICATION NAME: Turkey Tail Mushroom Supplement, Disp: , Rfl:     Allergies: No Known Allergies    Social history:   Social History     Socioeconomic History    Marital status:      Spouse name: Not on file    Number of children: Not on file    Years of education: Not on file    Highest education level: Not on file   Occupational History    Not on file   Tobacco Use    Smoking status: Never    Smokeless tobacco: Never   Substance and Sexual Activity    Alcohol use: Not Currently    Drug use: Not Currently    Sexual activity: Yes     Partners: Male   Other Topics Concern    Not on file   Social History Narrative    Not on file     Social Drivers of Health     Financial Resource Strain: Low Risk  (12/29/2023)    [facility]    Overall Financial Resource Strain (CARDIA)     Difficulty of Paying  "Living Expenses: Not hard at all   Food Insecurity: No Food Insecurity (12/29/2023)    Received from [facility]    Hunger Vital Sign     Worried About Running Out of Food in the Last Year: Never true     Ran Out of Food in the Last Year: Never true   Transportation Needs: No Transportation Needs (12/29/2023)    Received from [facility]    PRAPARE - Transportation     Lack of Transportation (Medical): No     Lack of Transportation (Non-Medical): No   Physical Activity: Not on file   Stress: Not on file   Social Connections: Not on file   Interpersonal Safety: Low Risk  (4/9/2025)    Interpersonal Safety     Do you feel physically and emotionally safe where you currently live?: Yes     Within the past 12 months, have you been hit, slapped, kicked or otherwise physically hurt by someone?: No     Within the past 12 months, have you been humiliated or emotionally abused in other ways by your partner or ex-partner?: No   Housing Stability: Low Risk  (12/29/2023)    Received from [facility]     Retired - What is your living situation today? : I have a steady place to live     Physical exam:  Vitals:Ht 1.549 m (5' 1\")   Wt 61.7 kg (136 lb)   LMP 03/26/2025 (Exact Date)   BMI 25.70 kg/m    Gen: NAD, pleasant, interactive and answering questions appropriately, PS 0  HEENT: Normocephalic atraumatic, sclera anicteric  Neck: Full range of motion  Lungs: No respiratory distress, speaking in full sentences, no coughing  Skin: no facial rash noted  Psych: normal affect      Assessment/plan:   Geraldine Neves is a 34 year old female with a left [id number] estrogen receptor positive, progesterone receptor negative, HER2 positive breast cancer s/p lumpectomy here for follow up.    1.  Breast Cancer: we reviewed her final pathology.  Given the fact she had a lumpectomy she will need adjuvant radiation " and we discussed the rationale for this.  Given the size and micromet in the node,I recommend adjuvant TCH-P x 6 cycles followed by HP to complete one  year.  We reviewed the APHINITY Trial data.  We discussed lack of data with HER2 based therapy alone without chemotherapy and I do not recommend this.  We discussed the schedule of TCH-P as well as antiemetics and decadron.  I explained we don't use invitro chemo sensitivity assays as it hasn't been shown to translate into clinical benefit in a person.  We discussed the rare but serious side effects of anthracyclines including cardiomyopathy and secondary malignancies. We would work with her vacation dates to start chemotherapy. If she declines chemo, I would do HP alone (acknowledging likely something is better than nothing) but with the clear understanding that there is no data to support.  Given the ER positive nature of the cancer, I also recommend adjuvant endocrine therapy and we briefly discussed tamoxifen vs OFS/AI.  I've asked her to let me know how she wishes to proceed.    All of the patient's questions were answered.    Return to the office pending her treatment decision.    Thank you for allowing me to participate in the care of your patient.  Please call with any questions.    I personally reviewed the recent studies and I explained the rationale of the tests ordered today to the patient.     The longitudinal plan of care for the diagnosis(es)/condition(s) as documented were addressed during this visit. Due to the added complexity in care, I will continue to support Geraldine in the subsequent management and with ongoing continuity of care.      No orders of the defined types were placed in this encounter.

## 2025-05-05 NOTE — NURSING NOTE
Patient confirms medications and allergies are accurate via patients echeck in completion, and or denies any changes since last reviewed/verified.     Current patient location: [address] [place] [address]    Is the patient currently in the state of [place]? YES    Visit mode: VIDEO    If the visit is dropped, the patient can be reconnected by:VIDEO VISIT: Text to cell phone:   Telephone Information:   Mobile [phone]       Will anyone else be joining the visit? Marcelino Smart  (If patient encounters technical issues they should call [phone]    Are changes needed to the allergy or medication list? No    Are refills needed on medications prescribed by this physician? NO    Rooming Documentation:  Questionnaire(s) not done per department protocol    Reason for visit: RECHECK    She GARCIA

## 2025-06-02 ENCOUNTER — TRANSFERRED RECORDS (OUTPATIENT)
Dept: HEALTH INFORMATION MANAGEMENT | Facility: CLINIC | Age: 35
End: 2025-06-02
Payer: COMMERCIAL

## 2025-07-10 ENCOUNTER — MYC MEDICAL ADVICE (OUTPATIENT)
Dept: ONCOLOGY | Facility: CLINIC | Age: 35
End: 2025-07-10
Payer: COMMERCIAL

## 2025-07-10 DIAGNOSIS — N63.0 BREAST SWELLING: Primary | ICD-10-CM

## 2025-07-10 NOTE — PROGRESS NOTES
TELEPHONE CALL  Jul 10, 2025    Geraldine Neves is a 34 year old female s/p left lumpectomy and left axillary sentinel biopsy for left breast cancer 4/9/25 with Dr. Simpson.     She reports her left breast and left axilla are more swollen in the last couple of weeks. The left axilla feels tight. No fever or erythema.     Will arrange for left breast and left axillary ultrasound.     Sheila Harris PA-C

## 2025-07-19 ENCOUNTER — HEALTH MAINTENANCE LETTER (OUTPATIENT)
Age: 35
End: 2025-07-19

## 2025-07-21 ENCOUNTER — ANCILLARY PROCEDURE (OUTPATIENT)
Dept: ULTRASOUND IMAGING | Facility: CLINIC | Age: 35
End: 2025-07-21
Attending: PHYSICIAN ASSISTANT
Payer: COMMERCIAL

## 2025-07-21 DIAGNOSIS — N63.0 BREAST SWELLING: ICD-10-CM

## 2025-07-21 PROCEDURE — 76642 ULTRASOUND BREAST LIMITED: CPT | Mod: LT | Performed by: STUDENT IN AN ORGANIZED HEALTH CARE EDUCATION/TRAINING PROGRAM

## (undated) DEVICE — JELLY LUBRICATING SURGILUBE 2OZ TUBE

## (undated) DEVICE — GLOVE BIOGEL PI ULTRATOUCH SZ 8.0 41180

## (undated) DEVICE — ESU ELEC BLADE 2.75" COATED/INSULATED E1455

## (undated) DEVICE — SOL WATER IRRIG 1000ML BOTTLE 2F7114

## (undated) DEVICE — PREP CHLORAPREP 26ML TINTED HI-LITE ORANGE 930815

## (undated) DEVICE — SU SILK 3-0 SH 30" K832H

## (undated) DEVICE — TUBING SUCTION MEDI-VAC 1/4"X20' N620A

## (undated) DEVICE — SPONGE LAP 18X18" 1515

## (undated) DEVICE — SU MONOCRYL 0 CT-1 36" Y346H

## (undated) DEVICE — CATH TRAY FOLEY SURESTEP 16FR WDRAIN BAG STLK LATEX A300316A

## (undated) DEVICE — SU SILK 3-0 X-1 18" 632G

## (undated) DEVICE — SUCTION MANIFOLD NEPTUNE 2 SYS 4 PORT 0702-020-000

## (undated) DEVICE — STOCKING SLEEVE COMPRESSION CALF LG

## (undated) DEVICE — SURGICEL ABSORBABLE HEMOSTAT SNOW 4"X4" 2083

## (undated) DEVICE — SU PDS II 4-0 FS-2 27" Z422H

## (undated) DEVICE — LINEN TOWEL PACK X6 WHITE 5487

## (undated) DEVICE — BASIN SET MAJOR

## (undated) DEVICE — GLOVE PROTEXIS BLUE W/NEU-THERA 7.0  2D73EB70

## (undated) DEVICE — PACK C-SECTION LF PL15OTA83B

## (undated) DEVICE — DRAPE SHEET REV FOLD 3/4 9349

## (undated) DEVICE — SOL NACL 0.9% INJ 1000ML BAG 07983-09

## (undated) DEVICE — MINOR SINGLE BASIN KIT CSR WRAPX2 7QT SSK3002

## (undated) DEVICE — KIT PROCEDURE SPY-PHI SGL HH9006

## (undated) DEVICE — TUBING IRRIG CYSTO/BLADDER SET 81" LF 2C4040

## (undated) DEVICE — CLIP HORIZON MED BLUE 002200

## (undated) DEVICE — SOL NACL 0.9% IRRIG 3000ML BAG 2B7477

## (undated) DEVICE — SU VICRYL+ 3-0 27IN SH UND VCP416H

## (undated) DEVICE — CLIP HORIZON SM RED WIDE SLOT 001201

## (undated) DEVICE — SOL NACL 0.9% IRRIG 1000ML BOTTLE 07138-09

## (undated) DEVICE — DRAPE SHEET MED 44X70" 9355

## (undated) DEVICE — PREP SKIN SCRUB TRAY 4461A

## (undated) DEVICE — PREP POVIDONE IODINE USP 10% TOPICAL SOL 64537161

## (undated) DEVICE — LINEN TOWEL PACK X5 5464

## (undated) DEVICE — SYR 30ML LL W/O NDL

## (undated) DEVICE — ADH FLOSEAL W/HUMAN THROMBIN 5ML 1501825

## (undated) DEVICE — SU VICRYL 0 CT-1 36" J346H

## (undated) DEVICE — SU VICRYL 4-0 PS-2 18" UND J496G

## (undated) DEVICE — SU DERMABOND ADVANCED .7ML DNX12

## (undated) DEVICE — SPONGE RAY-TEC 4X8" 7318

## (undated) DEVICE — COVER NEOPROBE SOFTFLEX 5X96" W/BANDS 20-PC596

## (undated) DEVICE — STRAP KNEE/BODY 31143004

## (undated) DEVICE — PREP POVIDONE IODINE USP 7.5% CLEANING SOL 64538161

## (undated) DEVICE — SUCTION CANISTER MEDIVAC LINER 1500ML W/LID 65651-515

## (undated) DEVICE — DRAPE IOBAN INCISE 23X17" 6650EZ

## (undated) DEVICE — SOL WATER IRRIG 1000ML BOTTLE 07139-09

## (undated) DEVICE — GLOVE ESTEEM POWDER FREE SMT 6.5  2D72PT65

## (undated) DEVICE — Device

## (undated) DEVICE — ESU GROUND PAD ADULT W/CORD E7507

## (undated) DEVICE — SUCTION MANIFOLD DORNOCH ULTRA CART UL-CL500

## (undated) DEVICE — DRAPE U SPLIT 74X120" 29440

## (undated) DEVICE — DRAPE GYN/UROLOGY FLUID POUCH TUR 29455

## (undated) DEVICE — PREP CHLORAPREP 26ML TINTED ORANGE  260815

## (undated) RX ORDER — OXYTOCIN/0.9 % SODIUM CHLORIDE 30/500 ML
PLASTIC BAG, INJECTION (ML) INTRAVENOUS
Status: DISPENSED
Start: 2020-10-07

## (undated) RX ORDER — ONDANSETRON 2 MG/ML
INJECTION INTRAMUSCULAR; INTRAVENOUS
Status: DISPENSED
Start: 2020-10-07

## (undated) RX ORDER — FENTANYL CITRATE-0.9 % NACL/PF 10 MCG/ML
PLASTIC BAG, INJECTION (ML) INTRAVENOUS
Status: DISPENSED
Start: 2020-10-07

## (undated) RX ORDER — DEXAMETHASONE SODIUM PHOSPHATE 4 MG/ML
INJECTION, SOLUTION INTRA-ARTICULAR; INTRALESIONAL; INTRAMUSCULAR; INTRAVENOUS; SOFT TISSUE
Status: DISPENSED
Start: 2020-10-07

## (undated) RX ORDER — FUROSEMIDE 10 MG/ML
INJECTION INTRAMUSCULAR; INTRAVENOUS
Status: DISPENSED
Start: 2020-10-07

## (undated) RX ORDER — LIDOCAINE HYDROCHLORIDE AND EPINEPHRINE 10; 10 MG/ML; UG/ML
INJECTION, SOLUTION INFILTRATION; PERINEURAL
Status: DISPENSED
Start: 2025-04-09

## (undated) RX ORDER — KETOROLAC TROMETHAMINE 30 MG/ML
INJECTION, SOLUTION INTRAMUSCULAR; INTRAVENOUS
Status: DISPENSED
Start: 2025-04-09

## (undated) RX ORDER — CEFAZOLIN SODIUM/WATER 2 G/20 ML
SYRINGE (ML) INTRAVENOUS
Status: DISPENSED
Start: 2025-04-09

## (undated) RX ORDER — KETOROLAC TROMETHAMINE 30 MG/ML
INJECTION, SOLUTION INTRAMUSCULAR; INTRAVENOUS
Status: DISPENSED
Start: 2020-10-07

## (undated) RX ORDER — PROPOFOL 10 MG/ML
INJECTION, EMULSION INTRAVENOUS
Status: DISPENSED
Start: 2020-10-07

## (undated) RX ORDER — FENTANYL CITRATE 50 UG/ML
INJECTION, SOLUTION INTRAMUSCULAR; INTRAVENOUS
Status: DISPENSED
Start: 2025-04-09

## (undated) RX ORDER — CEFAZOLIN SODIUM 1 G/3ML
INJECTION, POWDER, FOR SOLUTION INTRAMUSCULAR; INTRAVENOUS
Status: DISPENSED
Start: 2020-10-07

## (undated) RX ORDER — GLYCOPYRROLATE 0.2 MG/ML
INJECTION, SOLUTION INTRAMUSCULAR; INTRAVENOUS
Status: DISPENSED
Start: 2025-04-09

## (undated) RX ORDER — DEXAMETHASONE SODIUM PHOSPHATE 4 MG/ML
INJECTION, SOLUTION INTRA-ARTICULAR; INTRALESIONAL; INTRAMUSCULAR; INTRAVENOUS; SOFT TISSUE
Status: DISPENSED
Start: 2025-04-09

## (undated) RX ORDER — FENTANYL CITRATE 50 UG/ML
INJECTION, SOLUTION INTRAMUSCULAR; INTRAVENOUS
Status: DISPENSED
Start: 2020-10-07

## (undated) RX ORDER — ACETAMINOPHEN 325 MG/1
TABLET ORAL
Status: DISPENSED
Start: 2020-10-07

## (undated) RX ORDER — ACETAMINOPHEN 325 MG/1
TABLET ORAL
Status: DISPENSED
Start: 2025-04-09

## (undated) RX ORDER — BUPIVACAINE HYDROCHLORIDE 2.5 MG/ML
INJECTION, SOLUTION EPIDURAL; INFILTRATION; INTRACAUDAL; PERINEURAL
Status: DISPENSED
Start: 2025-04-09

## (undated) RX ORDER — MORPHINE SULFATE 1 MG/ML
INJECTION, SOLUTION EPIDURAL; INTRATHECAL; INTRAVENOUS
Status: DISPENSED
Start: 2020-10-07

## (undated) RX ORDER — ONDANSETRON 2 MG/ML
INJECTION INTRAMUSCULAR; INTRAVENOUS
Status: DISPENSED
Start: 2025-04-09